# Patient Record
Sex: FEMALE | Race: WHITE | NOT HISPANIC OR LATINO | Employment: STUDENT | URBAN - METROPOLITAN AREA
[De-identification: names, ages, dates, MRNs, and addresses within clinical notes are randomized per-mention and may not be internally consistent; named-entity substitution may affect disease eponyms.]

---

## 2017-02-22 ENCOUNTER — HOSPITAL ENCOUNTER (EMERGENCY)
Facility: CLINIC | Age: 22
Discharge: HOME | End: 2017-02-22
Attending: EMERGENCY MEDICINE | Admitting: EMERGENCY MEDICINE
Payer: COMMERCIAL

## 2017-02-22 ENCOUNTER — APPOINTMENT (OUTPATIENT)
Dept: GENERAL RADIOLOGY | Facility: CLINIC | Age: 22
End: 2017-02-22
Attending: EMERGENCY MEDICINE
Payer: COMMERCIAL

## 2017-02-22 VITALS
SYSTOLIC BLOOD PRESSURE: 103 MMHG | RESPIRATION RATE: 11 BRPM | HEART RATE: 83 BPM | BODY MASS INDEX: 28.79 KG/M2 | DIASTOLIC BLOOD PRESSURE: 70 MMHG | WEIGHT: 173 LBS | OXYGEN SATURATION: 98 % | TEMPERATURE: 98.3 F

## 2017-02-22 DIAGNOSIS — R42 DIZZINESS: ICD-10-CM

## 2017-02-22 LAB
ALBUMIN UR-MCNC: NEGATIVE MG/DL
ANION GAP SERPL CALCULATED.3IONS-SCNC: 7 MMOL/L (ref 3–14)
APPEARANCE UR: ABNORMAL
BACTERIA #/AREA URNS HPF: ABNORMAL /HPF
BASOPHILS # BLD AUTO: 0 10E9/L (ref 0–0.2)
BASOPHILS NFR BLD AUTO: 0.3 %
BILIRUB UR QL STRIP: NEGATIVE
BUN SERPL-MCNC: 21 MG/DL (ref 7–30)
CALCIUM SERPL-MCNC: 8.7 MG/DL (ref 8.5–10.1)
CHLORIDE SERPL-SCNC: 110 MMOL/L (ref 94–109)
CO2 SERPL-SCNC: 26 MMOL/L (ref 20–32)
COLOR UR AUTO: YELLOW
CREAT SERPL-MCNC: 0.96 MG/DL (ref 0.52–1.04)
D DIMER PPP FEU-MCNC: 0.4 UG/ML FEU (ref 0–0.5)
DIFFERENTIAL METHOD BLD: NORMAL
EOSINOPHIL # BLD AUTO: 0.3 10E9/L (ref 0–0.7)
EOSINOPHIL NFR BLD AUTO: 3.7 %
ERYTHROCYTE [DISTWIDTH] IN BLOOD BY AUTOMATED COUNT: 12.6 % (ref 10–15)
GFR SERPL CREATININE-BSD FRML MDRD: 74 ML/MIN/1.7M2
GLUCOSE SERPL-MCNC: 94 MG/DL (ref 70–99)
GLUCOSE UR STRIP-MCNC: NEGATIVE MG/DL
HCG SERPL QL: NEGATIVE
HCT VFR BLD AUTO: 39.8 % (ref 35–47)
HGB BLD-MCNC: 13.4 G/DL (ref 11.7–15.7)
HGB UR QL STRIP: ABNORMAL
IMM GRANULOCYTES # BLD: 0 10E9/L (ref 0–0.4)
IMM GRANULOCYTES NFR BLD: 0.1 %
KETONES UR STRIP-MCNC: NEGATIVE MG/DL
LEUKOCYTE ESTERASE UR QL STRIP: ABNORMAL
LYMPHOCYTES # BLD AUTO: 3.4 10E9/L (ref 0.8–5.3)
LYMPHOCYTES NFR BLD AUTO: 39.6 %
MCH RBC QN AUTO: 30.2 PG (ref 26.5–33)
MCHC RBC AUTO-ENTMCNC: 33.7 G/DL (ref 31.5–36.5)
MCV RBC AUTO: 90 FL (ref 78–100)
MONOCYTES # BLD AUTO: 0.6 10E9/L (ref 0–1.3)
MONOCYTES NFR BLD AUTO: 6.5 %
MUCOUS THREADS #/AREA URNS LPF: PRESENT /LPF
NEUTROPHILS # BLD AUTO: 4.3 10E9/L (ref 1.6–8.3)
NEUTROPHILS NFR BLD AUTO: 49.8 %
NITRATE UR QL: NEGATIVE
NRBC # BLD AUTO: 0 10*3/UL
NRBC BLD AUTO-RTO: 0 /100
PH UR STRIP: 5.5 PH (ref 5–7)
PLATELET # BLD AUTO: 239 10E9/L (ref 150–450)
POTASSIUM SERPL-SCNC: 4 MMOL/L (ref 3.4–5.3)
RBC # BLD AUTO: 4.44 10E12/L (ref 3.8–5.2)
RBC #/AREA URNS AUTO: 1 /HPF (ref 0–2)
SODIUM SERPL-SCNC: 143 MMOL/L (ref 133–144)
SP GR UR STRIP: 1.02 (ref 1–1.03)
SQUAMOUS #/AREA URNS AUTO: 14 /HPF (ref 0–1)
TROPONIN I SERPL-MCNC: NORMAL UG/L (ref 0–0.04)
URN SPEC COLLECT METH UR: ABNORMAL
UROBILINOGEN UR STRIP-MCNC: NORMAL MG/DL (ref 0–2)
WBC # BLD AUTO: 8.6 10E9/L (ref 4–11)
WBC #/AREA URNS AUTO: 3 /HPF (ref 0–2)

## 2017-02-22 PROCEDURE — 99284 EMERGENCY DEPT VISIT MOD MDM: CPT | Mod: 25

## 2017-02-22 PROCEDURE — 96360 HYDRATION IV INFUSION INIT: CPT

## 2017-02-22 PROCEDURE — 80048 BASIC METABOLIC PNL TOTAL CA: CPT | Performed by: EMERGENCY MEDICINE

## 2017-02-22 PROCEDURE — 81001 URINALYSIS AUTO W/SCOPE: CPT | Performed by: EMERGENCY MEDICINE

## 2017-02-22 PROCEDURE — 84703 CHORIONIC GONADOTROPIN ASSAY: CPT | Performed by: EMERGENCY MEDICINE

## 2017-02-22 PROCEDURE — 71020 XR CHEST 2 VW: CPT

## 2017-02-22 PROCEDURE — 25000128 H RX IP 250 OP 636: Performed by: EMERGENCY MEDICINE

## 2017-02-22 PROCEDURE — 85025 COMPLETE CBC W/AUTO DIFF WBC: CPT | Performed by: EMERGENCY MEDICINE

## 2017-02-22 PROCEDURE — 84484 ASSAY OF TROPONIN QUANT: CPT | Performed by: EMERGENCY MEDICINE

## 2017-02-22 PROCEDURE — 96361 HYDRATE IV INFUSION ADD-ON: CPT

## 2017-02-22 PROCEDURE — 85379 FIBRIN DEGRADATION QUANT: CPT | Performed by: EMERGENCY MEDICINE

## 2017-02-22 RX ORDER — SODIUM CHLORIDE 9 MG/ML
1000 INJECTION, SOLUTION INTRAVENOUS CONTINUOUS
Status: DISCONTINUED | OUTPATIENT
Start: 2017-02-22 | End: 2017-02-22 | Stop reason: HOSPADM

## 2017-02-22 RX ADMIN — SODIUM CHLORIDE 1000 ML: 9 INJECTION, SOLUTION INTRAVENOUS at 02:01

## 2017-02-22 RX ADMIN — SODIUM CHLORIDE 1000 ML: 9 INJECTION, SOLUTION INTRAVENOUS at 02:49

## 2017-02-22 ASSESSMENT — ENCOUNTER SYMPTOMS
SHORTNESS OF BREATH: 0
NAUSEA: 0
FEVER: 0
NERVOUS/ANXIOUS: 1
LIGHT-HEADEDNESS: 1

## 2017-02-22 NOTE — ED AVS SNAPSHOT
Emergency Department    [address] [facility] [address]    Phone:  [phone]    Fax:  [phone]                                       Mar Velásquez   MRN: [id number]    Department:   Emergency Department   Date of Visit:  2/22/2017           Patient Information     Date Of Birth          1995        Your diagnoses for this visit were:     Dizziness        You were seen by Tra Campo MD.      Follow-up Information     Follow up with Aida Kemp MD In 1 week.    Specialty:  Pediatrics    Contact information:    [facility]  [address] [facility] [address]  [phone]          Follow up with  Emergency Department.    Specialty:  EMERGENCY MEDICINE    Why:  As needed, If symptoms worsen    Contact information:    [address] [facility] [id number] [phone]        Discharge Instructions         Possible Causes of Dizziness or Fainting  Dizziness and fainting can have many causes. Below are some examples of possible causes your health care provider will look to rule out.    Benign Paroxysmal Positional Vertigo (BPPV)  BPPV results when calcium crystals inside the inner ear shift into the wrong position. BPPV causes episodes of vertigo (a spinning sensation). Episodes most often occur when the head is moved in a certain way.  Infection or Inflammation  The semicircular canals of the ear may become infected or inflamed. In this case, they can send the wrong balance signals. This can cause vertigo.  Meniere s Disease  Meniere s disease happens when there is too much fluid in the semicircular canals. This can cause vertigo. It also can cause hearing problems and buzzing or ringing in the ears (called tinnitus). You may also have a feeling of pressure or fullness in the ear.  Syncope  Syncope is fainting that occurs when the brain doesn t get enough oxygen-rich blood. It can be caused by low heart rate or low blood pressure. This is  called vasovagal syncope. It can also be caused by sitting or standing up too quickly. This is called orthostatic hypotension. Syncope may also be due to a heart valve problem, an abnormal heart rhythm, or other heart problems. Dizziness can also occur from stroke, hemorrhage in the brain, or other problems in the brain. Your doctor may do certain tests to rule out these conditions.  Other Causes  Other causes include:    Medications. Certain medications can cause dizziness and even fainting. In some cases, stopping a medication too quickly can lead to withdrawal symptoms, including dizziness and fainting.    Anxiety. Being anxious can lead to breathing changes, such as hyperventilation. These can lead to dizziness and fainting.  Additional causes for dizziness and fainting also exist. Talk to your doctor for more information.          1659-6481 [employer]. [address] [place], [place] [id number]. All rights reserved. This information is not intended as a substitute for professional medical care. Always follow your healthcare professional's instructions.          Understanding Dizziness, Balance Problems, and Fainting  Balance is a group effort of the eyes, inner ear, joints, and muscles. They each send signals to the brain about body position and head movement. Then the brain uses this information to achieve balance. When the brain receives conflicting signals, or when there is a problem with blood flow, dizziness or fainting can happen.    Vertigo  Vertigo is the feeling of spinning. It may happen if the brain receives conflicting balance signals. Vertigo is often caused by a problem in the inner ear. Problems include changes in inner ear structures, infection, swelling, or excess fluid. Sometimes vertigo is due to a brain problem, such as migraine.   Dysequilibrium  Dysequilibrium is the feeling of imbalance without a sense of spinning. It may happen if the signal path between the body and brain  is disrupted. There are many causes of dysequilibrium, including diabetes, anemia, head injury, and aging.  Syncope  Syncope is losing consciousness or fainting. The brain needs oxygen-rich blood to function. The heart pumps that blood to the brain. If there is a problem with the heart, blood flow (such as low blood pressure), or blood vessels, faintness may happen.    0086-7859 [employer]. [address] [place] [address], [place] [address]. All rights reserved. This information is not intended as a substitute for professional medical care. Always follow your healthcare professional's instructions.          24 Hour Appointment Hotline       To make an appointment at any [facility], call [phone] ([phone]). If you don't have a family doctor or clinic, we will help you find one. [facility] clinics are conveniently located to serve the needs of you and your family.             Review of your medicines      Our records show that you are taking the medicines listed below. If these are incorrect, please call your family doctor or clinic.        Dose / Directions Last dose taken    ADDERALL PO        Refills:  0        albuterol 108 (90 BASE) MCG/ACT Inhaler   Commonly known as:  albuterol   Dose:  2 puff   Quantity:  1 Inhaler        Inhale 2 puffs into the lungs every 4 hours as needed for shortness of breath / dyspnea   Refills:  0          STOP taking        Dose Reason for stopping Comments    azithromycin 250 MG tablet   Commonly known as:  ZITHROMAX              benzonatate 200 MG capsule   Commonly known as:  TESSALON              HYDROcodone-acetaminophen 5-325 MG per tablet   Commonly known as:  NORCO                      Procedures and tests performed during your visit     Basic metabolic panel    CBC with platelets differential    Cardiac Continuous Monitoring    D dimer quantitative    HCG qualitative    Orthostatic blood pressure and pulse    Peripheral IV catheter    Pulse oximetry  nursing    Troponin I    UA with Microscopic    XR Chest 2 Views      Orders Needing Specimen Collection     None      Pending Results     No orders found from 2/20/2017 to 2/23/2017.            Pending Culture Results     No orders found from 2/20/2017 to 2/23/2017.             Test Results from your hospital stay     2/22/2017  2:02 AM - Interface, Flexilab Results      Component Results     Component Value Ref Range & Units Status    WBC 8.6 4.0 - 11.0 10e9/L Final    RBC Count 4.44 3.8 - 5.2 10e12/L Final    Hemoglobin 13.4 11.7 - 15.7 g/dL Final    Hematocrit 39.8 35.0 - 47.0 % Final    MCV 90 78 - 100 fl Final    MCH 30.2 26.5 - 33.0 pg Final    MCHC 33.7 31.5 - 36.5 g/dL Final    RDW 12.6 10.0 - 15.0 % Final    Platelet Count 239 150 - 450 10e9/L Final    Diff Method Automated Method  Final    % Neutrophils 49.8 % Final    % Lymphocytes 39.6 % Final    % Monocytes 6.5 % Final    % Eosinophils 3.7 % Final    % Basophils 0.3 % Final    % Immature Granulocytes 0.1 % Final    Nucleated RBCs 0 0 /100 Final    Absolute Neutrophil 4.3 1.6 - 8.3 10e9/L Final    Absolute Lymphocytes 3.4 0.8 - 5.3 10e9/L Final    Absolute Monocytes 0.6 0.0 - 1.3 10e9/L Final    Absolute Eosinophils 0.3 0.0 - 0.7 10e9/L Final    Absolute Basophils 0.0 0.0 - 0.2 10e9/L Final    Abs Immature Granulocytes 0.0 0 - 0.4 10e9/L Final    Absolute Nucleated RBC 0.0  Final         2/22/2017  2:14 AM - Interface, Flexilab Results      Component Results     Component Value Ref Range & Units Status    Sodium 143 133 - 144 mmol/L Final    Potassium 4.0 3.4 - 5.3 mmol/L Final    Chloride 110 (H) 94 - 109 mmol/L Final    Carbon Dioxide 26 20 - 32 mmol/L Final    Anion Gap 7 3 - 14 mmol/L Final    Glucose 94 70 - 99 mg/dL Final    Urea Nitrogen 21 7 - 30 mg/dL Final    Creatinine 0.96 0.52 - 1.04 mg/dL Final    GFR Estimate 74 >60 mL/min/1.7m2 Final    Non  GFR Calc    GFR Estimate If Black 89 >60 mL/min/1.7m2 Final    African American  GFR Calc    Calcium 8.7 8.5 - 10.1 mg/dL Final         2/22/2017  2:19 AM - Interface, Flexilab Results      Component Results     Component Value Ref Range & Units Status    Troponin I ES  0.000 - 0.045 ug/L Final    <0.015  The 99th percentile for upper reference range is 0.045 ug/L.  Troponin values in   the range of 0.045 - 0.120 ug/L may be associated with risks of adverse   clinical events.           2/22/2017  2:35 AM - Interface, Flexilab Results      Component Results     Component Value Ref Range & Units Status    HCG Qualitative Serum Negative NEG Final         2/22/2017  2:26 AM - Interface, Flexilab Results      Component Results     Component Value Ref Range & Units Status    D Dimer 0.4 0.0 - 0.50 ug/ml FEU Final    This D-dimer assay is intended for use in conjuntion with a clinical pretest   probability assessment model to exclude pulmonary embolism (PE) and as an aid   in the diagnosis of deep venous thrombosis (DVT) in outpatients suspected of   PE   or DVT. The cut-off value is 0.5 g/mL FEU.           2/22/2017  2:03 AM - Interface, Flexilab Results      Component Results     Component Value Ref Range & Units Status    Color Urine Yellow  Final    Appearance Urine Slightly Cloudy  Final    Glucose Urine Negative NEG mg/dL Final    Bilirubin Urine Negative NEG Final    Ketones Urine Negative NEG mg/dL Final    Specific Gravity Urine 1.021 1.003 - 1.035 Final    Blood Urine Moderate (A) NEG Final    pH Urine 5.5 5.0 - 7.0 pH Final    Protein Albumin Urine Negative NEG mg/dL Final    Urobilinogen mg/dL Normal 0.0 - 2.0 mg/dL Final    Nitrite Urine Negative NEG Final    Leukocyte Esterase Urine Trace (A) NEG Final    Source Midstream Urine  Final    WBC Urine 3 (H) 0 - 2 /HPF Final    RBC Urine 1 0 - 2 /HPF Final    Bacteria Urine Few (A) NEG /HPF Final    Squamous Epithelial /HPF Urine 14 (H) 0 - 1 /HPF Final    Mucous Urine Present (A) NEG /LPF Final         2/22/2017  3:22 AM - Interface, Radiant  Ib      Narrative     CHEST 2 VIEWS  2/22/2017 3:01 AM     HISTORY: Cough. Chest tightness.    COMPARISON: 6/14/2016.    FINDINGS: The lungs are clear. Normal-sized cardiac silhouette.        Impression     IMPRESSION: No evidence of active cardiopulmonary disease.    ZACHARY JEAN MD                Clinical Quality Measure: Blood Pressure Screening     Your blood pressure was checked while you were in the emergency department today. The last reading we obtained was  BP: 101/74 . Please read the guidelines below about what these numbers mean and what you should do about them.  If your systolic blood pressure (the top number) is less than 120 and your diastolic blood pressure (the bottom number) is less than 80, then your blood pressure is normal. There is nothing more that you need to do about it.  If your systolic blood pressure (the top number) is 120-139 or your diastolic blood pressure (the bottom number) is 80-89, your blood pressure may be higher than it should be. You should have your blood pressure rechecked within a year by a primary care provider.  If your systolic blood pressure (the top number) is 140 or greater or your diastolic blood pressure (the bottom number) is 90 or greater, you may have high blood pressure. High blood pressure is treatable, but if left untreated over time it can put you at risk for heart attack, stroke, or kidney failure. You should have your blood pressure rechecked by a primary care provider within the next 4 weeks.  If your provider in the emergency department today gave you specific instructions to follow-up with your doctor or provider even sooner than that, you should follow that instruction and not wait for up to 4 weeks for your follow-up visit.        Thank you for choosing [facility]       Thank you for choosing [facility] for your care. Our goal is always to provide you with excellent care. Hearing back from our patients is one way we can continue to improve our  "services. Please take a few minutes to complete the written survey that you may receive in the mail after you visit with us. Thank you!        [email or web address] Information     [facility] lets you send messages to your doctor, view your test results, renew your prescriptions, schedule appointments and more. To sign up, go to [email or web address] . Click on \"Log in\" on the left side of the screen, which will take you to the Welcome page. Then click on \"Sign up Now\" on the right side of the page.     You will be asked to enter the access code listed below, as well as some personal information. Please follow the directions to create your username and password.     Your access code is: [email or web address]  Expires: 2017  3:51 AM     Your access code will  in 90 days. If you need help or a new code, please call your [facility] clinic or [phone].        Care EveryWhere ID     This is your Care EveryWhere ID. This could be used by other organizations to access your [facility] medical records  [id number]        After Visit Summary       This is your record. Keep this with you and show to your community pharmacist(s) and doctor(s) at your next visit.                  "

## 2017-02-22 NOTE — DISCHARGE INSTRUCTIONS
Possible Causes of Dizziness or Fainting  Dizziness and fainting can have many causes. Below are some examples of possible causes your health care provider will look to rule out.    Benign Paroxysmal Positional Vertigo (BPPV)  BPPV results when calcium crystals inside the inner ear shift into the wrong position. BPPV causes episodes of vertigo (a spinning sensation). Episodes most often occur when the head is moved in a certain way.  Infection or Inflammation  The semicircular canals of the ear may become infected or inflamed. In this case, they can send the wrong balance signals. This can cause vertigo.  Meniere s Disease  Meniere s disease happens when there is too much fluid in the semicircular canals. This can cause vertigo. It also can cause hearing problems and buzzing or ringing in the ears (called tinnitus). You may also have a feeling of pressure or fullness in the ear.  Syncope  Syncope is fainting that occurs when the brain doesn t get enough oxygen-rich blood. It can be caused by low heart rate or low blood pressure. This is called vasovagal syncope. It can also be caused by sitting or standing up too quickly. This is called orthostatic hypotension. Syncope may also be due to a heart valve problem, an abnormal heart rhythm, or other heart problems. Dizziness can also occur from stroke, hemorrhage in the brain, or other problems in the brain. Your doctor may do certain tests to rule out these conditions.  Other Causes  Other causes include:    Medications. Certain medications can cause dizziness and even fainting. In some cases, stopping a medication too quickly can lead to withdrawal symptoms, including dizziness and fainting.    Anxiety. Being anxious can lead to breathing changes, such as hyperventilation. These can lead to dizziness and fainting.  Additional causes for dizziness and fainting also exist. Talk to your doctor for more information.          3394-9428 [employer] [address] [id number]. All rights reserved. This information is not intended as a substitute for professional medical care. Always follow your healthcare professional's instructions.          Understanding Dizziness, Balance Problems, and Fainting  Balance is a group effort of the eyes, inner ear, joints, and muscles. They each send signals to the brain about body position and head movement. Then the brain uses this information to achieve balance. When the brain receives conflicting signals, or when there is a problem with blood flow, dizziness or fainting can happen.    Vertigo  Vertigo is the feeling of spinning. It may happen if the brain receives conflicting balance signals. Vertigo is often caused by a problem in the inner ear. Problems include changes in inner ear structures, infection, swelling, or excess fluid. Sometimes vertigo is due to a brain problem, such as migraine.   Dysequilibrium  Dysequilibrium is the feeling of imbalance without a sense of spinning. It may happen if the signal path between the body and brain is disrupted. There are many causes of dysequilibrium, including diabetes, anemia, head injury, and aging.  Syncope  Syncope is losing consciousness or fainting. The brain needs oxygen-rich blood to function. The heart pumps that blood to the brain. If there is a problem with the heart, blood flow (such as low blood pressure), or blood vessels, faintness may happen.    6463-5415 [employer]. [address] [place] [address]. All rights reserved. This information is not intended as a substitute for professional medical care. Always follow your healthcare professional's instructions.

## 2017-02-22 NOTE — ED AVS SNAPSHOT
Emergency Department    [address] [place] [address]    Phone:  [phone]    Fax:  [phone]                                       Mar Velásquez   MRN: [id number]    Department:   Emergency Department   Date of Visit:  2/22/2017           After Visit Summary Signature Page     I have received my discharge instructions, and my questions have been answered. I have discussed any challenges I see with this plan with the nurse or doctor.    ..........................................................................................................................................  Patient/Patient Representative Signature      ..........................................................................................................................................  Patient Representative Print Name and Relationship to Patient    ..................................................               ................................................  Date                                            Time    ..........................................................................................................................................  Reviewed by Signature/Title    ...................................................              ..............................................  Date                                                            Time

## 2017-02-22 NOTE — ED PROVIDER NOTES
"  History     Chief Complaint:  Lightheadedness    HPI   Mar Velásquez is a 21 year old female who presents to the emergency department today for evaluation of lightheadedness. The patient reports that tonight she was at work when she was reaching for coins out of the register and started feeling lightheaded, as if she was going to faint, but affirms she did not lose consciousness. She had lunch prior to this and thought she would feel better with some water and relaxing, but did not. She went home and took a nap, but then woke up with a feeling of \"chest pressure\" and chills. She states that she continued to felt lightheaded as well and anxious; prompting her visit. Additionally, the patient notes that she is on Implanon birth control and does smoke cigarettes daily. She denies any long travel recently, history of DVT/PE or other concerns at this time. Moreover, she denies any shortness of breath, nausea, vision changes, leg swelling, or other concerns at this time. Of note, the patient also mentions that she stopped taking her Adderall three days ago because it was messing with her sleep cycle.     PE/DVT Risk Factors:  History of smoking - Positive  Birth Control: Positive  Personal history of PE/DVT - Negative  Family history of PE/DVT - Negative  Recent travel - Negative  Recent surgery - Negative  Other immobilizations - Negative  Cancer - Negative    Allergies:  No Known Drug Allergies     Medications:    Tessalon  Albuterol  Adderall     Past Medical History:    ADHD  Anxiety  Depression    Past Surgical History:    Tonsillectomy  ENT surgery    Family History:    Father - Hypertension, Diabetes  Paternal Grandfather - Alzheimer Disease    Social History:  The patient was accompanied to the ED by her mother.  Smoking Status: Current Every Day Smoker  Alcohol Use: Negative  Marital Status:  Single [1]    Review of Systems   Constitutional: Negative for fever.   Eyes: Negative for visual disturbance. " "  Respiratory: Negative for shortness of breath.    Cardiovascular: Positive for chest pain (\"Pressure\"). Negative for leg swelling.   Gastrointestinal: Negative for nausea.   Neurological: Positive for light-headedness. Negative for syncope.   Psychiatric/Behavioral: The patient is nervous/anxious.    All other systems reviewed and are negative.    Physical Exam   Vitals:  Patient Vitals for the past 24 hrs:   BP Temp Temp src Pulse Heart Rate Resp SpO2 Weight   02/22/17 0345 103/70 - - - 64 11 98 % -   02/22/17 0330 98/55 - - - 70 11 100 % -   02/22/17 0315 98/78 - - - 64 (!) 31 98 % -   02/22/17 0248 101/74 - - - 64 17 99 % -   02/22/17 0234 95/71 - - - 76 23 97 % -   02/22/17 0230 97/63 - - - 72 16 99 % -   02/22/17 0215 115/89 - - - 82 (!) 39 98 % -   02/22/17 0200 108/76 - - - 83 25 99 % -   02/22/17 0152 112/81 - - - 85 17 98 % -   02/22/17 0150 106/78 - - - 74 21 97 % -   02/22/17 0054 124/73 98.3  F (36.8  C) Oral 83 - 16 97 % 78.5 kg (173 lb)     Physical Exam  General: Alert, interactive in mild distress  Head:  Scalp is atraumatic  Eyes:  The pupils are equal, round, and reactive to light    EOM's intact    No scleral icterus  ENT:      Nose:  The external nose is normal  Ears:  External ears are normal  Mouth/Throat: The oropharynx is normal    Mucus membranes are moist       Neck:  Normal range of motion.      There is no rigidity.    Trachea is in the midline         CV:  Regular rate and rhythm    No murmur   Resp:  Breath sounds are clear bilaterally    Non-labored, no retractions or accessory muscle use      GI:  Abdomen is soft, no distension, no tenderness.       MS:  Normal strength in all 4 extremities, No midline cervical, thoracic, or lumbar tenderness  Skin:  Warm and dry, No rash or lesions noted.  Neuro: Strength 5/5 x4.  Sensation intact  In all 4 extremities.      Cranial nerves 2-12 grossly intact.     Psych:  Awake. Alert.  Mildly anxious affect.      Appropriate " interactions.    Emergency Department Course     Imaging:  Radiology findings were communicated with the patient who voiced understanding of the findings.    Chest x-ray, 2 views:  No evidence of active cardiopulmonary disease.  Reading per radiology    Laboratory:  Laboratory findings were communicated with the patient who voiced understanding of the findings.    UA: Leukocyte Esterase: Trace (A), Urine Blood: Moderate (A), Bacteria: Few (A), WBC/HPF: 3 (H), Mucous Present (A), Squamous Cells: 14 (H)  CBC: AWNL. (WBC 8.6, HGB 13.4, )   BMP: Chloride: 110 (H) (Creatinine 0.96)  Troponin (Collected 0153): <0.015  HCG Qualitative Urine: Negative  D Dimer (Collected 0153): 0.4    Interventions:  0201 ns 1000 mL IV  Emergency Department Course:  Nursing notes and vitals reviewed.  I performed an exam of the patient as documented above.   The patient was sent for a Chest x-ray while in the emergency department, results above.   IV was inserted and blood was drawn for laboratory testing, results above.  The patient provided a urine sample here in the emergency department. This was sent for laboratory testing, findings above.  At 0334 the patient was rechecked and was updated on the results of her laboratory and imaging studies.   I discussed the treatment plan with the patient and her mother. They expressed understanding of this plan and consented to discharge. They will be discharged home with instructions for care and follow up. In addition, the patient will return to the emergency department if their symptoms persist, worsen, if new symptoms arise or if there is any concern.  All questions were answered.  I personally reviewed the laboratory and imaging results with the patient and answered all related questions prior to discharge.    Impression & Plan      Medical Decision Making:  Mar Velásquez is a 21 year old female who presents to the emergency department today for evaluation of lightheadedness. The above work  up was undertaken returning as largely unremarkable. I do not have a clear etiology of the patient's symptom complex, however, there are no signs of acute hemodynamic instability and with a negative D-dimer I doubt pulmonary embolism. With mild chest discomfort, no cardiac risk factors, a negative troponin, and a non-ischemic EKG I doubt ACS and do not believe she needs any further work up for this. There is no focal neurologic deficits to suggest stroke. There is on signs of electrolyte abnormality or anemia. Patient was feeling well throughout her stay in the ED and can be safely discharged to home. I have recommended follow up with her primary care provider and return if symptoms develop.     Diagnosis:    ICD-10-CM    1. Dizziness R42        Plan: Discharge home as noted above.      Scribe Disclosure:  I, Enoc Nelson, am serving as a scribe at 2:15 AM on 2/22/2017 to document services personally performed by Tra Campo MD, based on my observations and the provider's statements to me.    2/22/2017    EMERGENCY DEPARTMENT       Tra Campo MD  02/22/17 0627

## 2018-06-18 ENCOUNTER — OFFICE VISIT (OUTPATIENT)
Dept: SLEEP MEDICINE | Facility: CLINIC | Age: 23
End: 2018-06-18
Payer: COMMERCIAL

## 2018-06-18 VITALS
OXYGEN SATURATION: 97 % | HEART RATE: 76 BPM | SYSTOLIC BLOOD PRESSURE: 101 MMHG | HEIGHT: 65 IN | WEIGHT: 209 LBS | DIASTOLIC BLOOD PRESSURE: 68 MMHG | RESPIRATION RATE: 18 BRPM | BODY MASS INDEX: 34.82 KG/M2

## 2018-06-18 DIAGNOSIS — G47.21 CIRCADIAN RHYTHM SLEEP DISORDER, DELAYED SLEEP PHASE TYPE: Primary | ICD-10-CM

## 2018-06-18 DIAGNOSIS — F33.8 SEASONAL AFFECTIVE DISORDER (H): ICD-10-CM

## 2018-06-18 DIAGNOSIS — F51.01 PRIMARY INSOMNIA: ICD-10-CM

## 2018-06-18 PROCEDURE — 99205 OFFICE O/P NEW HI 60 MIN: CPT | Performed by: NURSE PRACTITIONER

## 2018-06-18 RX ORDER — LAMOTRIGINE 100 MG/1
TABLET ORAL
COMMUNITY
Start: 2018-04-20

## 2018-06-18 RX ORDER — CLINDAMYCIN PHOSPHATE 10 MG/G
GEL TOPICAL
COMMUNITY
Start: 2018-03-06

## 2018-06-18 RX ORDER — FLUTICASONE PROPIONATE 50 MCG
2 SPRAY, SUSPENSION (ML) NASAL
COMMUNITY
Start: 2017-04-07

## 2018-06-18 NOTE — PROGRESS NOTES
"Allergies:    No Known Allergies    Medications:    Current Outpatient Prescriptions   Medication Sig Dispense Refill     albuterol (ALBUTEROL) 108 (90 BASE) MCG/ACT inhaler Inhale 2 puffs into the lungs every 4 hours as needed for shortness of breath / dyspnea 1 Inhaler 0     Amphetamine-Dextroamphetamine (ADDERALL PO)        clindamycin (CLINDAMAX) 1 % topical gel Apply twice daily to affected areas       fluticasone (FLONASE) 50 MCG/ACT spray 2 sprays       lamoTRIgine (LAMICTAL) 100 MG tablet After 25 mg tabs, start 100mg daily for 1 week then go to 200mg daily         Problem List:  Patient Active Problem List    Diagnosis Date Noted     Pregnancy test positive 01/30/2015     Priority: Medium     Depression, major, severe recurrence (H) 01/30/2015     Priority: Medium     Situational depression 01/29/2015     Priority: Medium        Past Medical/Surgical History:  Past Medical History:   Diagnosis Date     ADHD (attention deficit hyperactivity disorder)      Anxiety      Depressive disorder      Past Surgical History:   Procedure Laterality Date     ENT SURGERY       toncillectomy             Physical Examination:  Vitals: /68  Pulse 76  Resp 18  Ht 1.651 m (5' 5\")  Wt 94.8 kg (209 lb)  SpO2 97%  BMI 34.78 kg/m2  BMI= Body mass index is 34.78 kg/(m^2).    Neck Cir (cm): 39 cm    Stetson Total Score 6/18/2018   Total score - Stetson 11       GENERAL APPEARANCE: healthy, alert and no distress      "

## 2018-06-18 NOTE — MR AVS SNAPSHOT
After Visit Summary   6/18/2018    Mar Velásquez    MRN: [id number]           Patient Information     Date Of Birth          1995        Visit Information        Provider Department      6/18/2018 3:15 PM Joann Guthrie APRN [facility] [place]        Today's Diagnoses     Seasonal affective disorder (H)    -  1      Care Instructions    Insomnia - Delayed Sleep Phase  Each of us has a built in tendency to find it easier to stay up late at night or get up early in the morning.  Being a  night owl  or  early bird  is a function of our internal body clock.  When you are forced to keep a sleep schedule that goes against your typical habits (because a job or other responsibilities) insomnia can be the result.  Try the following changes to see if you can improve your sleep patterns:    Pick a sleep and wake schedule with about 7-8 hours in bed that is consistent.  That means keep the same bedtime (enter bed when sleepy)  and rise time every day of the week including the weekends, for the next 4-6 weeks    Try to get outside for about 30 minutes after you get up in the morning if the sun is out.  Sunlight is the best way to  set  your internal body clock  ( SAD lamp)                       Block bright light by avoiding screens in the 1.5 hrs before bedtime, or wear sunglasses                      Please keep a sleep log or diary during this time to track your sleep patterns                      Naps: 30 mins or less                      Return in 2 wks with diaries and how are you doing,  Don't try to move more than 2 hrs in the next 2 wks.                      School work from chair, relaxation from outside bed    Your BMI is Body mass index is 34.78 kg/(m^2).  Weight management is a personal decision.  If you are interested in exploring weight loss strategies, the following discussion covers the approaches that may be successful. Body mass index (BMI) is one way to tell whether you  are at a healthy weight, overweight, or obese. It measures your weight in relation to your height.  A BMI of 18.5 to 24.9 is in the healthy range. A person with a BMI of 25 to 29.9 is considered overweight, and someone with a BMI of 30 or greater is considered obese. More than two-thirds of American adults are considered overweight or obese.  Being overweight or obese increases the risk for further weight gain. Excess weight may lead to heart disease and diabetes.  Creating and following plans for healthy eating and physical activity may help you improve your health.  Weight control is part of healthy lifestyle and includes exercise, emotional health, and healthy eating habits. Careful eating habits lifelong are the mainstay of weight control. Though there are significant health benefits from weight loss, long-term weight loss with diet alone may be very difficult to achieve- studies show long-term success with dietary management in less than 10% of people. Attaining a healthy weight may be especially difficult to achieve in those with severe obesity. In some cases, medications, devices and surgical management might be considered.  What can you do?  If you are overweight or obese and are interested in methods for weight loss, you should discuss this with your provider.     Consider reducing daily calorie intake by 500 calories.     Keep a food journal.     Avoiding skipping meals, consider cutting portions instead.    Diet combined with exercise helps maintain muscle while optimizing fat loss. Strength training is particularly important for building and maintaining muscle mass. Exercise helps reduce stress, increase energy, and improves fitness. Increasing exercise without diet control, however, may not burn enough calories to loose weight.       Start walking three days a week 10-20 minutes at a time    Work towards walking thirty minutes five days a week     Eventually, increase the speed of your walking for 1-2  minutes at time    In addition, we recommend that you review healthy lifestyles and methods for weight loss available through the National Institutes of Health patient information sites:  http://win.niddk.nih.gov/publications/index.htm    And look into health and wellness programs that may be available through your health insurance provider, employer, local community center, or [employer]old[employer] club.    Weight management plan: Patient was referred to their PCP to discuss a diet and exercise plan.              Follow-ups after your visit        Follow-up notes from your care team     Return in about 2 weeks (around 7/2/2018) for return in 2 wks .      Your next 10 appointments already scheduled     Jul 03, 2018  1:30 PM CDT   Return Sleep Patient with LYNN Yao CNP   [facility] ([facility])    [address] [place] [id number] [phone]              Who to contact     If you have questions or need follow up information about today's clinic visit or your schedule please contact [facility] directly at [phone].  Normal or non-critical lab and imaging results will be communicated to you by MyChart, letter or phone within 4 business days after the clinic has received the results. If you do not hear from us within 7 days, please contact the clinic through MyChart or phone. If you have a critical or abnormal lab result, we will notify you by phone as soon as possible.  Submit refill requests through [employer] or call your pharmacy and they will forward the refill request to us. Please allow 3 business days for your refill to be completed.          Additional Information About Your Visit        Care EveryWhere ID     This is your Care EveryWhere ID. This could be used by other organizations to access your [facility] medical records  [id number]        Your Vitals Were     Pulse Respirations Height Pulse Oximetry BMI (Body  "Mass Index)       76 18 1.651 m (5' 5\") 97% 34.78 kg/m2        Blood Pressure from Last 3 Encounters:   06/18/18 101/68   02/22/17 103/70   06/14/16 107/62    Weight from Last 3 Encounters:   06/18/18 94.8 kg (209 lb)   02/22/17 78.5 kg (173 lb)   06/14/16 90.7 kg (200 lb) (97 %)*     * Growth percentiles are based on [facility] 2-20 Years data.              We Performed the Following     SAD Light  ()        Primary Care Provider Office Phone # Fax #    Aida Kemp [phone]       [place] [id number] [facility] [address]        Equal Access to Services     TERA OLIVEROS : Hadii aad ku hadasho Solibby, waaxda luqadaha, qaybta kaalmada adeegyada, susan thomas . [facility] [phone].    ATENCIÓN: Si habla español, tiene a wilson disposición servicios gratuitos de asistencia lingüística. Deyaame al [phone].    We comply with applicable federal civil rights laws and [place] laws. We do not discriminate on the basis of race, color, national origin, age, disability, sex, sexual orientation, or gender identity.            Thank you!     Thank you for choosing [facility]  for your care. Our goal is always to provide you with excellent care. Hearing back from our patients is one way we can continue to improve our services. Please take a few minutes to complete the written survey that you may receive in the mail after your visit with us. Thank you!             Your Updated Medication List - Protect others around you: Learn how to safely use, store and throw away your medicines at www.disposemymeds.org.          This list is accurate as of 6/18/18  4:53 PM.  Always use your most recent med list.                   Brand Name Dispense Instructions for use Diagnosis    ADDERALL PO           albuterol 108 (90 Base) MCG/ACT Inhaler    PROAIR HFA    1 Inhaler    Inhale 2 puffs into the lungs every 4 hours as needed for shortness of breath / " dyspnea        clindamycin 1 % topical gel    CLINDAMAX     Apply twice daily to affected areas        fluticasone 50 MCG/ACT spray    FLONASE     2 sprays        lamoTRIgine 100 MG tablet    LaMICtal     After 25 mg tabs, start 100mg daily for 1 week then go to 200mg daily

## 2018-06-18 NOTE — PATIENT INSTRUCTIONS
Insomnia - Delayed Sleep Phase  Each of us has a built in tendency to find it easier to stay up late at night or get up early in the morning.  Being a  night owl  or  early bird  is a function of our internal body clock.  When you are forced to keep a sleep schedule that goes against your typical habits (because a job or other responsibilities) insomnia can be the result.  Try the following changes to see if you can improve your sleep patterns:    Pick a sleep and wake schedule with about 7-8 hours in bed that is consistent.  That means keep the same bedtime (enter bed when sleepy)  and rise time every day of the week including the weekends, for the next 4-6 weeks    Try to get outside for about 30 minutes after you get up in the morning if the sun is out.  Sunlight is the best way to  set  your internal body clock  ( SAD lamp)                       Block bright light by avoiding screens in the 1.5 hrs before bedtime, or wear sunglasses                      Please keep a sleep log or diary during this time to track your sleep patterns                      Naps: 30 mins or less                      Return in 2 wks with diaries and how are you doing,  Don't try to move more than 2 hrs in the next 2 wks.                      School work from chair, relaxation from outside bed    Your BMI is Body mass index is 34.78 kg/(m^2).  Weight management is a personal decision.  If you are interested in exploring weight loss strategies, the following discussion covers the approaches that may be successful. Body mass index (BMI) is one way to tell whether you are at a healthy weight, overweight, or obese. It measures your weight in relation to your height.  A BMI of 18.5 to 24.9 is in the healthy range. A person with a BMI of 25 to 29.9 is considered overweight, and someone with a BMI of 30 or greater is considered obese. More than two-thirds of American adults are considered overweight or obese.  Being overweight or obese  increases the risk for further weight gain. Excess weight may lead to heart disease and diabetes.  Creating and following plans for healthy eating and physical activity may help you improve your health.  Weight control is part of healthy lifestyle and includes exercise, emotional health, and healthy eating habits. Careful eating habits lifelong are the mainstay of weight control. Though there are significant health benefits from weight loss, long-term weight loss with diet alone may be very difficult to achieve- studies show long-term success with dietary management in less than 10% of people. Attaining a healthy weight may be especially difficult to achieve in those with severe obesity. In some cases, medications, devices and surgical management might be considered.  What can you do?  If you are overweight or obese and are interested in methods for weight loss, you should discuss this with your provider.     Consider reducing daily calorie intake by 500 calories.     Keep a food journal.     Avoiding skipping meals, consider cutting portions instead.    Diet combined with exercise helps maintain muscle while optimizing fat loss. Strength training is particularly important for building and maintaining muscle mass. Exercise helps reduce stress, increase energy, and improves fitness. Increasing exercise without diet control, however, may not burn enough calories to loose weight.       Start walking three days a week 10-20 minutes at a time    Work towards walking thirty minutes five days a week     Eventually, increase the speed of your walking for 1-2 minutes at time    In addition, we recommend that you review healthy lifestyles and methods for weight loss available through the National Institutes of Health patient information sites:  http://win.niddk.nih.gov/publications/index.htm    And look into health and wellness programs that may be available through your health insurance provider, employer, local community  center, or [employer] club.    Weight management plan: Patient was referred to their PCP to discuss a diet and exercise plan.

## 2018-06-20 NOTE — PROGRESS NOTES
Visit Date:   06/18/2018      DATE OF VISIT: 06/18/2018      LOCATION:  [facility].      CHIEF COMPLAINT:  Ms. Velásquez self-refers for nonrestorative sleep, feeling that she cannot sleep or go into a super deep sleep.      HISTORY OF PRESENT ILLNESS:  Reports that she has been having a problem with sleep since she was a child, having problems with difficulty waking up, especially when needing to urinate at night.  Then as she went on to high school, had a hard time awakening for school and has found it quite easy to sleep in.  She also reports that 12 hours is not enough sleep at night and some episodes of wakeups with crying.      Sleep schedule is exceptionally erratic. Currently is on summer break.  May enter bed somewhere between 11 p.m. and 12 a.m., takes her melatonin around that time when she enters bed. Sleep usually occurs somewhere between 3 and 4 in the morning.  On weekends, will be able to fall asleep at around 6 in the morning.  Exits bed at about 3 p.m. on workdays and the weekend.  On rare occasions, may be able to get up out of bed at about 1 p.m.  She usually has dinner at about 8 p.m. when school was on board.  Most of her best time to do her homework was somewhere between 10 p.m. and 3 in the morning.  She wakes up 2 times a night, feels like it might take a couple hours to fall back asleep after wake up, usually with nocturia or not feeling well. When she wakes up, she presses the snooze. She is estimating she gets 4 hours of sleep on work nights, 12 on weekends.  Feels her best if she gets 12.  Does nap 5 times a week and they can be for 3 hours straight.  When she does wake up in the middle of the night, she will check the clock, she will worry about next day's function, she will worry about assignments that need to be done the next day, goes through her to-do list.  Activities in bed do include using a computer in bed.  She can turn the light onto an anti-blue.  She  does have a chair to do homework from.  Sleep might be disrupted by nightmares 3 times a week.  Sometimes those nightmares may affect her mood the next day or stay with her mind.  She does grind her teeth during the night and on occasion wakes up with a dry throat in the morning.  No signs of orexin deficiency with the exception of some sleep paralysis.      SOCIAL, PERSONAL, FAMILY HISTORY, AND SLEEPINESS SCALES: She is single, lives with mom, dad and her sister. She is currently a student at [employer] where the majority of her classes are online; however, this can be deceiving since often there are meetings that she has to go to earlier in the day.  Sleep environment is conducive to good sleep; however, there is some lack of privacy.      Family history is positive for sleep apnea, restless legs, sleepwalking.      Substances: Occasional alcohol with friends if she goes out.  No marijuana or cocaine.  Will take melatonin to help herself fall asleep.  Does use tobacco, about 8 cigarettes a day.  Did smoke about a pack a day for about a year and a half.        Her [place] Sleepiness Score today is 11/24.  Past 6 months, she has had some problems with sleepiness while driving where she feels spaced out while driving.  No motor vehicle accidents.  Does feel sleepiness affects her school with decreased energy; however, she does do well.  30/30 days, she is tired and fatigued.  30/30 days, mental health not good, and discussing this brings about tears since her cousin over in [place], I believe, needs a serious surgery due to a growth along his spinal column.      REVIEW OF SYSTEMS:  A 14-point comprehensive review of systems completed and negative with the exception of the following:   CONSTITUTIONAL:  Weight has gone up over the last year; however, she is on the losing end and doing quite well.   NERVOUS SYSTEM:  Headaches, can be any time.  Gets migraines, sometimes needs to remove herself from noise and light to nap.  "  PULMONARY:  Shortness of breath with activity; 2 flights, can do quite well with that.  Occasional cough up mucus.   DIGESTIVE:  Constipation at times.   MENTAL HEALTH:  Depression, anxiety and other mental health issues.      SURGICAL HISTORY:  Tonsillectomy.       MEDICAL HISTORY: Bipolar disorder, ADHD. Has been on Adderall since she was a sophomore in high school, takes it about 8 in the morning when she is in school, and on Lamictal.   Allergies:    No Known Allergies    Medications:    Current Outpatient Prescriptions   Medication Sig Dispense Refill     albuterol (ALBUTEROL) 108 (90 BASE) MCG/ACT inhaler Inhale 2 puffs into the lungs every 4 hours as needed for shortness of breath / dyspnea 1 Inhaler 0     Amphetamine-Dextroamphetamine (ADDERALL PO)        clindamycin (CLINDAMAX) 1 % topical gel Apply twice daily to affected areas       fluticasone (FLONASE) 50 MCG/ACT spray 2 sprays       lamoTRIgine (LAMICTAL) 100 MG tablet After 25 mg tabs, start 100mg daily for 1 week then go to 200mg daily         Problem List:  Patient Active Problem List    Diagnosis Date Noted     Pregnancy test positive 01/30/2015     Priority: Medium     Depression, major, severe recurrence (H) 01/30/2015     Priority: Medium     Situational depression 01/29/2015     Priority: Medium        Past Medical/Surgical History:  Past Medical History:   Diagnosis Date     ADHD (attention deficit hyperactivity disorder)      Anxiety      Depressive disorder      Past Surgical History:   Procedure Laterality Date     ENT SURGERY       toncillectomy             Physical Examination:  Vitals: /68  Pulse 76  Resp 18  Ht 1.651 m (5' 5\")  Wt 94.8 kg (209 lb)  SpO2 97%  BMI 34.78 kg/m2  BMI= Body mass index is 34.78 kg/(m^2).    Neck Cir (cm): 39 cm    [place] Total Score 6/18/2018   Total score - [place] 11       GENERAL APPEARANCE: healthy, alert and no distress     ASSESSMENT AND PLAN:  It is my impression that Ms. Mar Velásquez " struggles with delayed sleep phase disorder.  She has been a night owl for a prolonged period of time and has an interest in phase advancing. Concern, however, is she will be traveling east to support her cousin while he has surgery and this will make returning to her [facility] time zone quite challenging before school, and yet she seeks to make these changes. The following plan of care has been developed.   1.  Delayed sleep phase disorder.  I have laid out a plan for her to phase advance.  She is to avoid bright light at night and provide 30 minutes of bright light the first thing in the morning for the first half hour of the day.  She is to gradually move her rise time up by half hour to hour intervals over perhaps every 5 days till she has advanced by about 3 hrs, and then stay there until she does go to [place], and will worry about returning to a normal sleep schedule when she comes back.  I will see her in 2 weeks' time with diaries to see how things are going with this start.   2.  Seasonal affect disorder, depression and energy is worse in the winter months.  I have provided her with a 10,000 lux lamp.   3.  Possible features of insomnia with daytime worry, clock watching, worry about performance the next day.  I have offered to provide her with some education regarding these measures as well.  We did discuss the fact that once she has a different time, she will be able to be more productive at different times of day that would be more in line with the rest of the students in her class.      Thank you for allowing me to participate in this kind woman's care.      Time spent with patient -- 60 minutes, of which greater than 50% was spent in counseling, education and coordination of care.         LYNN CORBETT, CNP             D: 2018   T: 2018   MT:       Name:     ARTEMIO BULLARD   MRN:      [id number]        Account:      [id number]   :      1995           Visit Date:    06/18/2018      Document: [id number]

## 2018-07-09 ENCOUNTER — OFFICE VISIT (OUTPATIENT)
Dept: SLEEP MEDICINE | Facility: CLINIC | Age: 23
End: 2018-07-09
Payer: COMMERCIAL

## 2018-07-09 VITALS
HEIGHT: 65 IN | DIASTOLIC BLOOD PRESSURE: 76 MMHG | HEART RATE: 78 BPM | SYSTOLIC BLOOD PRESSURE: 105 MMHG | BODY MASS INDEX: 34.49 KG/M2 | WEIGHT: 207 LBS | RESPIRATION RATE: 16 BRPM | OXYGEN SATURATION: 98 %

## 2018-07-09 DIAGNOSIS — G47.21 CIRCADIAN RHYTHM SLEEP DISORDER, DELAYED SLEEP PHASE TYPE: Primary | ICD-10-CM

## 2018-07-09 DIAGNOSIS — F51.04 PSYCHOPHYSIOLOGICAL INSOMNIA: ICD-10-CM

## 2018-07-09 PROCEDURE — 99214 OFFICE O/P EST MOD 30 MIN: CPT | Performed by: NURSE PRACTITIONER

## 2018-07-09 RX ORDER — DEXTROAMPHETAMINE SACCHARATE, AMPHETAMINE ASPARTATE MONOHYDRATE, DEXTROAMPHETAMINE SULFATE AND AMPHETAMINE SULFATE 7.5; 7.5; 7.5; 7.5 MG/1; MG/1; MG/1; MG/1
CAPSULE, EXTENDED RELEASE ORAL
Refills: 0 | COMMUNITY
Start: 2018-02-27

## 2018-07-09 RX ORDER — BUPROPION HYDROCHLORIDE 150 MG/1
TABLET ORAL
Refills: 2 | COMMUNITY
Start: 2017-08-25

## 2018-07-09 NOTE — MR AVS SNAPSHOT
"              After Visit Summary   7/9/2018    Mar Velásquez    MRN: [id number]           Patient Information     Date Of Birth          1995        Visit Information        Provider Department      7/9/2018 11:00 AM Joann Guthrie APRN CNP [facility]        Care Instructions    Time spent in bed:  Reduce time in bed not devoted to sleep  Worry time: 3 hrs before bedtime.  Journal : list: to do list for next day, for week, thoughts/feelings/concerns  With wakeup: \"i've already done this work\"  Iphone: insight timer-ideas on mental distraction, visualization  Cover your clock    Stimulus control:  Bed for sleep and sex only (as you are doing)  Enter bed only when sleepy  Cover clocks, set alarm for wakeup for time  Document on sleep logs when you wakeup with your alarm      Set wakeup time: bring light  SAD 10,000 for 1/2 hr  Set bedtime: bedtime will eventually move up with exposure to light  Nap: on timer 30-40 mins when you get home, and on weekends    12:30 for wakeup with SAD lamp,  Keep there for 5 days, then move another 1/2 hr up      What are you going to do when sleepy when you don't want to be   At Home:  Walk around,  Get water,  Some people are helped by:  low isa snack: celery  Brief trip out the door  Call a friend    Off of work:   Poly. After lunch: brief timed nap  See above  Multitask-drawers, plants,        Sleep habits:  Alcohol not within 3-4 hrs of bedtime  My chart in 3 days-How is it going and do you need sleeper or not      No driving if exceptionally sleepy          Follow-ups after your visit        Follow-up notes from your care team     Return in about 21 days (around 7/30/2018) for get my chart let me know how you are doing,  follow up in 3 wks.      Who to contact     If you have questions or need follow up information about today's clinic visit or your schedule please contact [facility] directly at [phone].  Normal or non-critical lab " "and imaging results will be communicated to you by MyChart, letter or phone within 4 business days after the clinic has received the results. If you do not hear from us within 7 days, please contact the clinic through MyChart or phone. If you have a critical or abnormal lab result, we will notify you by phone as soon as possible.  Submit refill requests through [employer] or call your pharmacy and they will forward the refill request to us. Please allow 3 business days for your refill to be completed.          Additional Information About Your Visit        Care EveryWhere ID     This is your Care EveryWhere ID. This could be used by other organizations to access your [facility] medical records  [id number]        Your Vitals Were     Pulse Respirations Height Pulse Oximetry BMI (Body Mass Index)       78 16 1.651 m (5' 5\") 98% 34.45 kg/m2        Blood Pressure from Last 3 Encounters:   07/09/18 105/76   06/18/18 101/68   02/22/17 103/70    Weight from Last 3 Encounters:   07/09/18 93.9 kg (207 lb)   06/18/18 94.8 kg (209 lb)   02/22/17 78.5 kg (173 lb)              Today, you had the following     No orders found for display       Primary Care Provider Office Phone # Fax #    Aida Kemp [phone]       [address] [address]        Equal Access to Services     TERA OLIVEROS AH: Hadii shanda acosta hadasho Solibby, waaxda luqadaha, qaybta kaalmada evert, susan newman. So [facility] [phone].    ATENCIÓN: Si habla español, tiene a wilson disposición servicios gratuitos de asistencia lingüística. Osiel austin [phone].    We comply with applicable federal civil rights laws and [place] laws. We do not discriminate on the basis of race, color, national origin, age, disability, sex, sexual orientation, or gender identity.            Thank you!     Thank you for choosing [facility]  for your care. Our goal is always to provide " you with excellent care. Hearing back from our patients is one way we can continue to improve our services. Please take a few minutes to complete the written survey that you may receive in the mail after your visit with us. Thank you!             Your Updated Medication List - Protect others around you: Learn how to safely use, store and throw away your medicines at www.disposemymeds.org.          This list is accurate as of 7/9/18 11:52 AM.  Always use your most recent med list.                   Brand Name Dispense Instructions for use Diagnosis    albuterol 108 (90 Base) MCG/ACT Inhaler    PROAIR HFA    1 Inhaler    Inhale 2 puffs into the lungs every 4 hours as needed for shortness of breath / dyspnea        amphetamine-dextroamphetamine 30 MG per 24 hr capsule    ADDERALL XR          buPROPion 150 MG 24 hr tablet    WELLBUTRIN XL          clindamycin 1 % topical gel    CLINDAMAX     Apply twice daily to affected areas        fluticasone 50 MCG/ACT spray    FLONASE     2 sprays        lamoTRIgine 100 MG tablet    LaMICtal     After 25 mg tabs, start 100mg daily for 1 week then go to 200mg daily

## 2018-07-09 NOTE — PATIENT INSTRUCTIONS
"Time spent in bed:  Reduce time in bed not devoted to sleep  Worry time: 3 hrs before bedtime.  Journal : list: to do list for next day, for week, thoughts/feelings/concerns  With wakeup: \"i've already done this work\"  Iphone: insight timer-ideas on mental distraction, visualization  Cover your clock    Stimulus control:  Bed for sleep and sex only (as you are doing)  Enter bed only when sleepy  Cover clocks, set alarm for wakeup for time  Document on sleep logs when you wakeup with your alarm      Set wakeup time: bring light  SAD 10,000 for 1/2 hr  Set bedtime: bedtime will eventually move up with exposure to light  Nap: on timer 30-40 mins when you get home, and on weekends    12:30 for wakeup with SAD lamp,  Keep there for 5 days, then move another 1/2 hr up      What are you going to do when sleepy when you don't want to be   At Home:  Walk around,  Get water,  Some people are helped by:  low isa snack: celery  Brief trip out the door  Call a friend    Off of work:   Poly. After lunch: brief timed nap  See above  Multitask-drawers, plants,        Sleep habits:  Alcohol not within 3-4 hrs of bedtime  My chart in 3 days      No driving if exceptionally sleepy  "

## 2018-07-09 NOTE — PROGRESS NOTES
"    CC: pt returns today to discuss sleep schedule and develop a plan to gradually phase advance her \"erratic\" sleep schedule.     Reports that she has been having a problem with sleep since she was a child, having problems with difficulty waking up, especially when needing to urinate at night.  Then as she went on to high school, had a hard time awakening for school and has found it quite easy to sleep in.  She also reports that 12 hours is not enough sleep at night and some episodes of wakeups with crying.      Sleep schedule is exceptionally erratic or could be considered \"flexible\".  She understands her symptoms of fatigue and feeling poorly may be related to her malaligned circadian rhythm.    Verbal sleep schedule:  Bedtime:  Enter bed 12:00  2 or 3A- (recent change) home work is done late at night when she feels most alert.    Wind down: :12-2 or 3: wind down now from bed, but could do so from chair in room to read, use computer.     Sleep Latency: music left on, turn phone on silent,  1 or 2 hrs, HS 2 or 3.  Wakeups: maybe 1 every other day  Return to sleep:  Final wakeup time: 12- 1 pm,     Naps: when tired, not productive: 1- 4hrs in length 7-11P , 4/7    If wakeup at 7A, then may nap 1pm    Not staring at the clock  Worry at night-on occasion.    Allergies:    No Known Allergies    Medications:    Current Outpatient Prescriptions   Medication Sig Dispense Refill     albuterol (ALBUTEROL) 108 (90 BASE) MCG/ACT inhaler Inhale 2 puffs into the lungs every 4 hours as needed for shortness of breath / dyspnea 1 Inhaler 0     amphetamine-dextroamphetamine (ADDERALL XR) 30 MG per 24 hr capsule   0     buPROPion (WELLBUTRIN XL) 150 MG 24 hr tablet   2     clindamycin (CLINDAMAX) 1 % topical gel Apply twice daily to affected areas       fluticasone (FLONASE) 50 MCG/ACT spray 2 sprays       lamoTRIgine (LAMICTAL) 100 MG tablet After 25 mg tabs, start 100mg daily for 1 week then go to 200mg daily         Problem " "List:  Patient Active Problem List    Diagnosis Date Noted     Pregnancy test positive 01/30/2015     Priority: Medium     Depression, major, severe recurrence (H) 01/30/2015     Priority: Medium     Situational depression 01/29/2015     Priority: Medium        Past Medical/Surgical History:  Past Medical History:   Diagnosis Date     ADHD (attention deficit hyperactivity disorder)      Anxiety      Depressive disorder      Past Surgical History:   Procedure Laterality Date     ENT SURGERY       toncillectomy             Physical Examination:  Vitals: /76  Pulse 78  Resp 16  Ht 1.651 m (5' 5\")  Wt 93.9 kg (207 lb)  SpO2 98%  BMI 34.45 kg/m2  BMI= Body mass index is 34.45 kg/(m^2).         [place] Total Score 7/9/2018   Total score - [place] 9       GENERAL APPEARANCE: healthy, alert and mild distress    Assessment/Plan:  1.  Delayed sleep phase disorder with an irregular wake time.  I recommended that she use her sad lamp for one half hour (10,000 Lux) starting at 12:30 in the morning it will take some time for her bedtime to stabilize and normalize at approximately 2:30 AM she has a goal rise time of 9 AM and a bedtime of approximately midnight that she is working  towards.  She is to move her rise time up by half hour approximately every 5 days, keep sleep diaries, and return to see me in about 3 weeks time.  She is able to communicate with me by my chart.  She is encouraged to keep herself busy if it is not appropriate for her to nap, and if she does nap to keep it less than 40 minutes and on a timer.  2.  Insomnia, likely psychophysiologic she has a fair amount of worry with regard to daytime function with her inability to fall or stay asleep at the times she needs to get her work done.  I have given her some behavioral measures to help her keep alertness during the time when she is to be maintaining wakefulness, scutal utilize worry time since she tends to ruminate about her to do list and to " continue to keep the clock covered and use insight timer for mental distraction.      Time spent with patient 30 minutes of which greater than 50% was spent in counseling education and coordination of care end of dictation

## 2018-11-19 ENCOUNTER — HOSPITAL ENCOUNTER (EMERGENCY)
Facility: CLINIC | Age: 23
Discharge: HOME | End: 2018-11-19
Attending: EMERGENCY MEDICINE | Admitting: EMERGENCY MEDICINE
Payer: COMMERCIAL

## 2018-11-19 VITALS
SYSTOLIC BLOOD PRESSURE: 109 MMHG | TEMPERATURE: 98.1 F | BODY MASS INDEX: 32.99 KG/M2 | RESPIRATION RATE: 16 BRPM | WEIGHT: 198 LBS | HEART RATE: 85 BPM | OXYGEN SATURATION: 98 % | HEIGHT: 65 IN | DIASTOLIC BLOOD PRESSURE: 69 MMHG

## 2018-11-19 DIAGNOSIS — R41.3 AMNESIA: ICD-10-CM

## 2018-11-19 DIAGNOSIS — T74.21XA SEXUAL ASSAULT OF ADULT, INITIAL ENCOUNTER: ICD-10-CM

## 2018-11-19 LAB — HCG UR QL: NEGATIVE

## 2018-11-19 PROCEDURE — 25000132 ZZH RX MED GY IP 250 OP 250 PS 637: Performed by: EMERGENCY MEDICINE

## 2018-11-19 PROCEDURE — 25000128 H RX IP 250 OP 636: Performed by: EMERGENCY MEDICINE

## 2018-11-19 PROCEDURE — 25000125 ZZHC RX 250: Performed by: EMERGENCY MEDICINE

## 2018-11-19 PROCEDURE — 96372 THER/PROPH/DIAG INJ SC/IM: CPT

## 2018-11-19 PROCEDURE — 99285 EMERGENCY DEPT VISIT HI MDM: CPT | Mod: 25

## 2018-11-19 PROCEDURE — 81025 URINE PREGNANCY TEST: CPT | Performed by: EMERGENCY MEDICINE

## 2018-11-19 RX ORDER — AZITHROMYCIN 250 MG/1
1000 TABLET, FILM COATED ORAL ONCE
Status: COMPLETED | OUTPATIENT
Start: 2018-11-19 | End: 2018-11-19

## 2018-11-19 RX ORDER — METRONIDAZOLE 500 MG/1
2000 TABLET ORAL ONCE
Status: COMPLETED | OUTPATIENT
Start: 2018-11-19 | End: 2018-11-19

## 2018-11-19 RX ORDER — EMTRICITABINE AND TENOFOVIR DISOPROXIL FUMARATE 200; 300 MG/1; MG/1
1 TABLET, FILM COATED ORAL ONCE
Status: COMPLETED | OUTPATIENT
Start: 2018-11-19 | End: 2018-11-19

## 2018-11-19 RX ORDER — EMTRICITABINE AND TENOFOVIR DISOPROXIL FUMARATE 200; 300 MG/1; MG/1
1 TABLET, FILM COATED ORAL DAILY
Qty: 27 TABLET | Refills: 0 | Status: SHIPPED | OUTPATIENT
Start: 2018-11-19 | End: 2018-12-16

## 2018-11-19 RX ADMIN — LIDOCAINE HYDROCHLORIDE 250 MG: 10 INJECTION, SOLUTION EPIDURAL; INFILTRATION; INTRACAUDAL; PERINEURAL at 20:47

## 2018-11-19 RX ADMIN — EMTRICITABINE AND TENOFOVIR DISOPROXIL FUMARATE 1 TABLET: 200; 300 TABLET, FILM COATED ORAL at 20:42

## 2018-11-19 RX ADMIN — DOLUTEGRAVIR SODIUM 50 MG: 50 TABLET, FILM COATED ORAL at 20:42

## 2018-11-19 RX ADMIN — ULIPRISTAL ACETATE 30 MG: 30 TABLET ORAL at 20:42

## 2018-11-19 RX ADMIN — AZITHROMYCIN MONOHYDRATE 1000 MG: 250 TABLET ORAL at 20:42

## 2018-11-19 RX ADMIN — METRONIDAZOLE 2000 MG: 500 TABLET ORAL at 20:42

## 2018-11-19 ASSESSMENT — ENCOUNTER SYMPTOMS
HEADACHES: 0
COLOR CHANGE: 1
WOUND: 0
ABDOMINAL PAIN: 1
VOMITING: 1
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
## 2018-11-19 NOTE — ED AVS SNAPSHOT
Emergency Department    [address] [place] [address]    Phone:  [phone]    Fax:  [phone]                                       Mar Velásquez   MRN: [id number]    Department:   Emergency Department   Date of Visit:  11/19/2018           After Visit Summary Signature Page     I have received my discharge instructions, and my questions have been answered. I have discussed any challenges I see with this plan with the nurse or doctor.    ..........................................................................................................................................  Patient/Patient Representative Signature      ..........................................................................................................................................  Patient Representative Print Name and Relationship to Patient    ..................................................               ................................................  Date                                   Time    ..........................................................................................................................................  Reviewed by Signature/Title    ...................................................              ..............................................  Date                                               Time          22EPIC Rev 08/18
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
## 2020-08-12 ENCOUNTER — HOSPITAL ENCOUNTER (EMERGENCY)
Facility: CLINIC | Age: 25
Discharge: HOME | End: 2020-08-12
Attending: EMERGENCY MEDICINE | Admitting: EMERGENCY MEDICINE
Payer: COMMERCIAL

## 2020-08-12 ENCOUNTER — APPOINTMENT (OUTPATIENT)
Dept: ULTRASOUND IMAGING | Facility: CLINIC | Age: 25
End: 2020-08-12
Attending: EMERGENCY MEDICINE
Payer: COMMERCIAL

## 2020-08-12 VITALS
DIASTOLIC BLOOD PRESSURE: 80 MMHG | HEART RATE: 80 BPM | SYSTOLIC BLOOD PRESSURE: 122 MMHG | OXYGEN SATURATION: 98 % | RESPIRATION RATE: 16 BRPM | WEIGHT: 207.23 LBS | BODY MASS INDEX: 34.49 KG/M2 | TEMPERATURE: 98.2 F

## 2020-08-12 DIAGNOSIS — R11.10 RECURRENT VOMITING: ICD-10-CM

## 2020-08-12 LAB
ALBUMIN SERPL-MCNC: 3.8 G/DL (ref 3.4–5)
ALBUMIN UR-MCNC: 10 MG/DL
ALP SERPL-CCNC: 60 U/L (ref 40–150)
ALT SERPL W P-5'-P-CCNC: 27 U/L (ref 0–50)
ANION GAP SERPL CALCULATED.3IONS-SCNC: 4 MMOL/L (ref 3–14)
APPEARANCE UR: ABNORMAL
AST SERPL W P-5'-P-CCNC: 17 U/L (ref 0–45)
BASOPHILS # BLD AUTO: 0.1 10E9/L (ref 0–0.2)
BASOPHILS NFR BLD AUTO: 0.7 %
BILIRUB SERPL-MCNC: 0.2 MG/DL (ref 0.2–1.3)
BILIRUB UR QL STRIP: NEGATIVE
BUN SERPL-MCNC: 19 MG/DL (ref 7–30)
CALCIUM SERPL-MCNC: 9.3 MG/DL (ref 8.5–10.1)
CAOX CRY #/AREA URNS HPF: ABNORMAL /HPF
CHLORIDE SERPL-SCNC: 104 MMOL/L (ref 94–109)
CO2 SERPL-SCNC: 30 MMOL/L (ref 20–32)
COLOR UR AUTO: YELLOW
CREAT SERPL-MCNC: 0.97 MG/DL (ref 0.52–1.04)
DIFFERENTIAL METHOD BLD: NORMAL
EOSINOPHIL # BLD AUTO: 0.2 10E9/L (ref 0–0.7)
EOSINOPHIL NFR BLD AUTO: 2.1 %
ERYTHROCYTE [DISTWIDTH] IN BLOOD BY AUTOMATED COUNT: 12 % (ref 10–15)
GFR SERPL CREATININE-BSD FRML MDRD: 82 ML/MIN/{1.73_M2}
GLUCOSE SERPL-MCNC: 94 MG/DL (ref 70–99)
GLUCOSE UR STRIP-MCNC: NEGATIVE MG/DL
HCG UR QL: NEGATIVE
HCT VFR BLD AUTO: 38.9 % (ref 35–47)
HGB BLD-MCNC: 13.1 G/DL (ref 11.7–15.7)
HGB UR QL STRIP: ABNORMAL
IMM GRANULOCYTES # BLD: 0 10E9/L (ref 0–0.4)
IMM GRANULOCYTES NFR BLD: 0.1 %
KETONES UR STRIP-MCNC: 5 MG/DL
LEUKOCYTE ESTERASE UR QL STRIP: NEGATIVE
LIPASE SERPL-CCNC: 94 U/L (ref 73–393)
LYMPHOCYTES # BLD AUTO: 3.8 10E9/L (ref 0.8–5.3)
LYMPHOCYTES NFR BLD AUTO: 42.6 %
MCH RBC QN AUTO: 30.9 PG (ref 26.5–33)
MCHC RBC AUTO-ENTMCNC: 33.7 G/DL (ref 31.5–36.5)
MCV RBC AUTO: 92 FL (ref 78–100)
MONOCYTES # BLD AUTO: 0.7 10E9/L (ref 0–1.3)
MONOCYTES NFR BLD AUTO: 7.9 %
MUCOUS THREADS #/AREA URNS LPF: PRESENT /LPF
NEUTROPHILS # BLD AUTO: 4.2 10E9/L (ref 1.6–8.3)
NEUTROPHILS NFR BLD AUTO: 46.6 %
NITRATE UR QL: NEGATIVE
NRBC # BLD AUTO: 0 10*3/UL
NRBC BLD AUTO-RTO: 0 /100
PH UR STRIP: 5.5 PH (ref 5–7)
PLATELET # BLD AUTO: 266 10E9/L (ref 150–450)
POTASSIUM SERPL-SCNC: 4 MMOL/L (ref 3.4–5.3)
PROT SERPL-MCNC: 7.8 G/DL (ref 6.8–8.8)
RBC # BLD AUTO: 4.24 10E12/L (ref 3.8–5.2)
RBC #/AREA URNS AUTO: 7 /HPF (ref 0–2)
SODIUM SERPL-SCNC: 138 MMOL/L (ref 133–144)
SOURCE: ABNORMAL
SP GR UR STRIP: 1.03 (ref 1–1.03)
SQUAMOUS #/AREA URNS AUTO: 12 /HPF (ref 0–1)
UROBILINOGEN UR STRIP-MCNC: 2 MG/DL (ref 0–2)
WBC # BLD AUTO: 8.9 10E9/L (ref 4–11)
WBC #/AREA URNS AUTO: 1 /HPF (ref 0–5)

## 2020-08-12 PROCEDURE — 96375 TX/PRO/DX INJ NEW DRUG ADDON: CPT

## 2020-08-12 PROCEDURE — 80053 COMPREHEN METABOLIC PANEL: CPT | Performed by: EMERGENCY MEDICINE

## 2020-08-12 PROCEDURE — 83690 ASSAY OF LIPASE: CPT | Performed by: EMERGENCY MEDICINE

## 2020-08-12 PROCEDURE — 81025 URINE PREGNANCY TEST: CPT | Performed by: EMERGENCY MEDICINE

## 2020-08-12 PROCEDURE — 96374 THER/PROPH/DIAG INJ IV PUSH: CPT

## 2020-08-12 PROCEDURE — 85025 COMPLETE CBC W/AUTO DIFF WBC: CPT | Performed by: EMERGENCY MEDICINE

## 2020-08-12 PROCEDURE — 99285 EMERGENCY DEPT VISIT HI MDM: CPT | Mod: 25

## 2020-08-12 PROCEDURE — 76705 ECHO EXAM OF ABDOMEN: CPT

## 2020-08-12 PROCEDURE — 25000128 H RX IP 250 OP 636: Performed by: EMERGENCY MEDICINE

## 2020-08-12 PROCEDURE — 81001 URINALYSIS AUTO W/SCOPE: CPT | Performed by: EMERGENCY MEDICINE

## 2020-08-12 RX ORDER — DIPHENHYDRAMINE HYDROCHLORIDE 50 MG/ML
25 INJECTION INTRAMUSCULAR; INTRAVENOUS ONCE
Status: COMPLETED | OUTPATIENT
Start: 2020-08-12 | End: 2020-08-12

## 2020-08-12 RX ORDER — PROCHLORPERAZINE MALEATE 10 MG
10 TABLET ORAL EVERY 6 HOURS PRN
Qty: 15 TABLET | Refills: 0 | Status: SHIPPED | OUTPATIENT
Start: 2020-08-12

## 2020-08-12 RX ADMIN — PROCHLORPERAZINE EDISYLATE 5 MG: 5 INJECTION INTRAMUSCULAR; INTRAVENOUS at 05:05

## 2020-08-12 RX ADMIN — DIPHENHYDRAMINE HYDROCHLORIDE 25 MG: 50 INJECTION, SOLUTION INTRAMUSCULAR; INTRAVENOUS at 05:05

## 2020-08-12 ASSESSMENT — ENCOUNTER SYMPTOMS: ABDOMINAL PAIN: 1

## 2020-08-12 NOTE — ED PROVIDER NOTES
History     Chief Complaint:  Abdominal Pain    HPI   Mar Velásquez is a 25 year old female who presents with abdominal pain and nausea. The patient has been having nausea and emesis for the last three days; the episodes of emesis come on very abruptly as well as sharp shooting pains in her right upper quadrant. She reports that she has had fatigue and alternating episodes of dark diarrhea with constipation. She video called [facility] yesterday and was started on Zofran and advised to come to the ED if symptoms worsened; she was not able to alleviate her nausea and decided to present. She denies any sore throat, loss of taste/smell, dysuria, hematuria, hematochezia, cough, shortness of breath, chest pain, or fever.      Allergies:  No Known Drug Allergies     Medications:    Zofran  Cymbalta  Adderall  Trazodone  Atarax  Albuterol inhaler     Past Medical History:    Anxiety  Asthma  Depression  ADHD  Obesity     Past Surgical History:    Tonsillectomy     Family History:    Hypertension  Diabetes    Social History:  Positive for tobacco use.  Positive for alcohol use.   Negative for drug use.  Marital Status:  Single     Review of Systems   Gastrointestinal: Positive for abdominal pain.   All other systems reviewed and are negative.  See HPI for ROS, all other systems are negative.    Physical Exam   First Vitals:  Patient Vitals for the past 24 hrs:   BP Temp Temp src Pulse Resp SpO2 Weight   08/12/20 0101 122/80 98.2  F (36.8  C) Oral 80 16 98 % 94 kg (207 lb 3.7 oz)        Physical Exam  Nursing note and vitals reviewed.  Constitutional:  Appears well-developed and well-nourished.   HENT:   Head:    Atraumatic.   Mouth/Throat:   Oropharynx is clear and moist. No oropharyngeal exudate.   Eyes:    Pupils are equal, round, and reactive to light.   Neck:    Normal range of motion. Neck supple.      No tracheal deviation present. No thyromegaly present.   Cardiovascular:  Normal rate, regular rhythm, no murmur    Pulmonary/Chest: Breath sounds are clear and equal without wheezes or crackles.  Abdominal:   Soft. Bowel sounds are normal. Exhibits no distension and      no mass. Right upper quadrant tenderness. No CVA tenderness.      There is no rebound and no guarding.   Musculoskeletal:  Exhibits no edema.   Lymphadenopathy:  No cervical adenopathy.   Neurological:   Alert and oriented to person, place, and time.   Skin:    Skin is warm and dry. No rash noted. No pallor.      Emergency Department Course     Imaging:  Radiology findings were communicated with the patient who voiced understanding of the findings.    Abdomen US, limited (RUQ only)    1.  No cholelithiasis, pericholecystic fluid or gallbladder wall thickening.    2.  No biliary dilatation.    3.  Liver unremarkable.    4.  No hydronephrosis.      Laboratory:  Laboratory findings were communicated with the patient who voiced understanding of the findings.    UA with Microscopic: blood trace (A) Ketone 5 (A) protein albumin 10 (A) Mucous presnt (A) RBC/HPF 7 (H) Squamous Epithelial 12 (H) Mucous present (A) Calcium oxalate few (A)   O/w WNL   HCG Qualitative: Negative    Lipase: 94    CBC: WBC 8.9, HGB 13.1,   CMP: AWNL (Creatinine 0.97)     Interventions:  0505 Compazine 5 mg IV  0505 Benadryl 25 mg IV    Emergency Department Course:  0337 IV was inserted and blood was drawn for laboratory testing, results above.     0408 Nursing notes and vitals reviewed.    0420 I performed an exam of the patient as documented above.     0505 The patient was sent for a US while in the emergency department, results above.      0458 The patient provided a urine sample here in the emergency department. This was sent for laboratory testing, findings above.     0557 Findings and plan explained to the Patient. Patient discharged home with instructions regarding supportive care, medications, and reasons to return. The importance of close follow-up was reviewed.     Impression  & Plan     Medical Decision Making:  I found this patient to have recurrent vomiting which I think is likely due to viral gastroenteritis. There is no sign of appendicitis, hepatitis, pancreatitis, cholecystitis, pregnancy, UTI, or other serious problem and felt she could be safely discharged home. She does not appear significantly dehydrated and she was tolerating PO here.  She was prescribed compazine since she is not able to tolerate the Zofran and referred to Dr. Jarquin of GI. Discussed signs and symptoms to return for. She was told to take a benadryl with the compazine and to avoid driving with the compazine as it can make her tired.      Diagnosis:    ICD-10-CM    1. Recurrent vomiting  R11.10        Disposition:  The patient is discharged to home.     Discharge Medications:  New Prescriptions    PROCHLORPERAZINE (COMPAZINE) 10 MG TABLET    Take 1 tablet (10 mg) by mouth every 6 hours as needed for nausea or vomiting No driving a car or drinking alcohol for 6 hours after taking this medication.       Scribe Disclosure:  I, Jarrod Mcgee, am serving as a scribe at 4:20 AM on 8/12/2020 to document services personally performed by Marisa Murrell MD based on my observations and the provider's statements to me.         EMERGENCY DEPARTMENT       Marisa Murrell MD  08/12/20 6182

## 2020-08-12 NOTE — ED NOTES
"Patient having specific questions & worried \"specifically about my liver\".  Explained ED process & tests ordered based on symptoms she is presenting with.    Patient in no obvious distress.  Talkative & pleasant.    "
[facility]

## 2020-08-12 NOTE — ED AVS SNAPSHOT
Emergency Department  [address] [facility] [address]  Phone:  [phone]  Fax:  [phone]                                    Mar Velásquez   MRN: [id number]    Department:   Emergency Department   Date of Visit:  8/12/2020           After Visit Summary Signature Page    I have received my discharge instructions, and my questions have been answered. I have discussed any challenges I see with this plan with the nurse or doctor.    ..........................................................................................................................................  Patient/Patient Representative Signature      ..........................................................................................................................................  Patient Representative Print Name and Relationship to Patient    ..................................................               ................................................  Date                                   Time    ..........................................................................................................................................  Reviewed by Signature/Title    ...................................................              ..............................................  Date                                               Time          22EPIC Rev 08/18

## 2020-08-24 ENCOUNTER — HOSPITAL ENCOUNTER (EMERGENCY)
Facility: CLINIC | Age: 25
Discharge: HOME | End: 2020-08-24
Attending: EMERGENCY MEDICINE | Admitting: EMERGENCY MEDICINE
Payer: COMMERCIAL

## 2020-08-24 VITALS
OXYGEN SATURATION: 97 % | SYSTOLIC BLOOD PRESSURE: 101 MMHG | RESPIRATION RATE: 20 BRPM | HEIGHT: 65 IN | DIASTOLIC BLOOD PRESSURE: 68 MMHG | HEART RATE: 110 BPM | BODY MASS INDEX: 34.49 KG/M2 | TEMPERATURE: 97.5 F | WEIGHT: 207 LBS

## 2020-08-24 DIAGNOSIS — K59.00 CONSTIPATION, UNSPECIFIED CONSTIPATION TYPE: ICD-10-CM

## 2020-08-24 DIAGNOSIS — R10.84 GENERALIZED ABDOMINAL PAIN: ICD-10-CM

## 2020-08-24 DIAGNOSIS — K62.5 RECTAL BLEEDING: ICD-10-CM

## 2020-08-24 LAB
ABO + RH BLD: NORMAL
ABO + RH BLD: NORMAL
ALBUMIN SERPL-MCNC: 3.9 G/DL (ref 3.4–5)
ALBUMIN UR-MCNC: NEGATIVE MG/DL
ALP SERPL-CCNC: 62 U/L (ref 40–150)
ALT SERPL W P-5'-P-CCNC: 20 U/L (ref 0–50)
ANION GAP SERPL CALCULATED.3IONS-SCNC: 2 MMOL/L (ref 3–14)
APPEARANCE UR: CLEAR
AST SERPL W P-5'-P-CCNC: 15 U/L (ref 0–45)
BASOPHILS # BLD AUTO: 0 10E9/L (ref 0–0.2)
BASOPHILS NFR BLD AUTO: 0.4 %
BILIRUB SERPL-MCNC: 0.2 MG/DL (ref 0.2–1.3)
BILIRUB UR QL STRIP: NEGATIVE
BLD GP AB SCN SERPL QL: NORMAL
BLOOD BANK CMNT PATIENT-IMP: NORMAL
BUN SERPL-MCNC: 12 MG/DL (ref 7–30)
CALCIUM SERPL-MCNC: 9.3 MG/DL (ref 8.5–10.1)
CHLORIDE SERPL-SCNC: 105 MMOL/L (ref 94–109)
CO2 SERPL-SCNC: 30 MMOL/L (ref 20–32)
COLOR UR AUTO: YELLOW
CREAT SERPL-MCNC: 0.82 MG/DL (ref 0.52–1.04)
DIFFERENTIAL METHOD BLD: NORMAL
EOSINOPHIL # BLD AUTO: 0.3 10E9/L (ref 0–0.7)
EOSINOPHIL NFR BLD AUTO: 3.2 %
ERYTHROCYTE [DISTWIDTH] IN BLOOD BY AUTOMATED COUNT: 12 % (ref 10–15)
GFR SERPL CREATININE-BSD FRML MDRD: >90 ML/MIN/{1.73_M2}
GLUCOSE SERPL-MCNC: 79 MG/DL (ref 70–99)
GLUCOSE UR STRIP-MCNC: NEGATIVE MG/DL
HCG UR QL: NEGATIVE
HCT VFR BLD AUTO: 40.7 % (ref 35–47)
HEMOCCULT STL QL: NEGATIVE
HGB BLD-MCNC: 13.9 G/DL (ref 11.7–15.7)
HGB UR QL STRIP: NEGATIVE
IMM GRANULOCYTES # BLD: 0 10E9/L (ref 0–0.4)
IMM GRANULOCYTES NFR BLD: 0.1 %
KETONES UR STRIP-MCNC: NEGATIVE MG/DL
LEUKOCYTE ESTERASE UR QL STRIP: NEGATIVE
LIPASE SERPL-CCNC: 123 U/L (ref 73–393)
LYMPHOCYTES # BLD AUTO: 3.1 10E9/L (ref 0.8–5.3)
LYMPHOCYTES NFR BLD AUTO: 36.6 %
MCH RBC QN AUTO: 31.2 PG (ref 26.5–33)
MCHC RBC AUTO-ENTMCNC: 34.2 G/DL (ref 31.5–36.5)
MCV RBC AUTO: 92 FL (ref 78–100)
MONOCYTES # BLD AUTO: 0.7 10E9/L (ref 0–1.3)
MONOCYTES NFR BLD AUTO: 8 %
NEUTROPHILS # BLD AUTO: 4.3 10E9/L (ref 1.6–8.3)
NEUTROPHILS NFR BLD AUTO: 51.7 %
NITRATE UR QL: NEGATIVE
NRBC # BLD AUTO: 0 10*3/UL
NRBC BLD AUTO-RTO: 0 /100
PH UR STRIP: 6.5 PH (ref 5–7)
PLATELET # BLD AUTO: 255 10E9/L (ref 150–450)
POTASSIUM SERPL-SCNC: 3.9 MMOL/L (ref 3.4–5.3)
PROT SERPL-MCNC: 7.6 G/DL (ref 6.8–8.8)
RBC # BLD AUTO: 4.45 10E12/L (ref 3.8–5.2)
RBC #/AREA URNS AUTO: 1 /HPF (ref 0–2)
SODIUM SERPL-SCNC: 137 MMOL/L (ref 133–144)
SOURCE: ABNORMAL
SP GR UR STRIP: 1.02 (ref 1–1.03)
SPECIMEN EXP DATE BLD: NORMAL
SQUAMOUS #/AREA URNS AUTO: 4 /HPF (ref 0–1)
UROBILINOGEN UR STRIP-MCNC: NORMAL MG/DL (ref 0–2)
WBC # BLD AUTO: 8.3 10E9/L (ref 4–11)
WBC #/AREA URNS AUTO: 0 /HPF (ref 0–5)

## 2020-08-24 PROCEDURE — 86901 BLOOD TYPING SEROLOGIC RH(D): CPT | Performed by: EMERGENCY MEDICINE

## 2020-08-24 PROCEDURE — 99283 EMERGENCY DEPT VISIT LOW MDM: CPT | Mod: 25

## 2020-08-24 PROCEDURE — 81001 URINALYSIS AUTO W/SCOPE: CPT | Performed by: EMERGENCY MEDICINE

## 2020-08-24 PROCEDURE — 25800030 ZZH RX IP 258 OP 636: Performed by: EMERGENCY MEDICINE

## 2020-08-24 PROCEDURE — 86900 BLOOD TYPING SEROLOGIC ABO: CPT | Performed by: EMERGENCY MEDICINE

## 2020-08-24 PROCEDURE — 96360 HYDRATION IV INFUSION INIT: CPT

## 2020-08-24 PROCEDURE — 85025 COMPLETE CBC W/AUTO DIFF WBC: CPT | Performed by: EMERGENCY MEDICINE

## 2020-08-24 PROCEDURE — 80053 COMPREHEN METABOLIC PANEL: CPT | Performed by: EMERGENCY MEDICINE

## 2020-08-24 PROCEDURE — 82272 OCCULT BLD FECES 1-3 TESTS: CPT | Performed by: EMERGENCY MEDICINE

## 2020-08-24 PROCEDURE — 81025 URINE PREGNANCY TEST: CPT | Performed by: EMERGENCY MEDICINE

## 2020-08-24 PROCEDURE — 96361 HYDRATE IV INFUSION ADD-ON: CPT

## 2020-08-24 PROCEDURE — 83605 ASSAY OF LACTIC ACID: CPT | Performed by: EMERGENCY MEDICINE

## 2020-08-24 PROCEDURE — 86850 RBC ANTIBODY SCREEN: CPT | Performed by: EMERGENCY MEDICINE

## 2020-08-24 PROCEDURE — 83690 ASSAY OF LIPASE: CPT | Performed by: EMERGENCY MEDICINE

## 2020-08-24 RX ORDER — SODIUM CHLORIDE 9 MG/ML
INJECTION, SOLUTION INTRAVENOUS CONTINUOUS
Status: DISCONTINUED | OUTPATIENT
Start: 2020-08-24 | End: 2020-08-24 | Stop reason: HOSPADM

## 2020-08-24 RX ADMIN — SODIUM CHLORIDE 1000 ML: 9 INJECTION, SOLUTION INTRAVENOUS at 18:51

## 2020-08-24 ASSESSMENT — ENCOUNTER SYMPTOMS
ABDOMINAL PAIN: 1
ANAL BLEEDING: 1
NAUSEA: 0
COUGH: 0
DYSURIA: 0
VOMITING: 0
RHINORRHEA: 0
SORE THROAT: 0

## 2020-08-24 ASSESSMENT — MIFFLIN-ST. JEOR: SCORE: 1689.83

## 2020-08-24 NOTE — ED TRIAGE NOTES
Intermittent abd pain X 2 wk, was seen here and started on omeprazole and reflux improved. States she had some rectal bleeding on Friday and Saturday, none since. Today c/o dizziness and being pale, called rn line and told pt had to come be evaled

## 2020-08-24 NOTE — ED PROVIDER NOTES
History     Chief Complaint:  Abdominal Pain and Rectal Bleeding    The history is provided by the patient.      Mar Velásquez is a 25 year old female who presents with abdominal pain and rectal bleeding. The patient reports being seen last week (8/12/20) due to several episodes of emesis. While here in the ED, she was given Compazine with Benadryl which alleviated the nausea and emesis. The patient presents now reporting rectal bleeding beginning last Friday (8/21/20) evening. The patient reports wiping after urinating and noticing a significant amount of bright red blood on the toilet paper. She reports having a bowel movement on Saturday (8/22/20) where she noticed having much darker stools than she is used to having. Today, the patient reports having abdominal pain that she describes as dull and episodic. She reports having intermittent pain in her left lower quadrant at night and occasional pain in her right upper quadrant. She reports being on Adderall, trazodone, Reglan, omeprazole, and duloxetine.  Currently in the ED, she has no pain. She denies dysuria, vaginal discharge, vaginal bleeding, chest pain, cough, sore throat, rhinorrhea.     Allergies:  No Known Drug Allergies     Medications:    Albuterol inhaler  Adderall  Cymbalta  Trazodone   Prilosec  Compazine  Desyrel  Atarax    Past Medical History:    Anxiety   Asthma  Acne  Depression  Obesity   ADHD  Spina bifida  Dyslipidemia  Allergic rhinitis  Self-injurious behavior    Past Surgical History:    Tonsillectomy    Family History:    CAD: Father  Type II diabetes  Anxiety  Bipolar disorder    Social History:  Positive for tobacco use.  Positive for alcohol use.   Negative for drug use.  Marital Status:  Single      Review of Systems   HENT: Negative for rhinorrhea and sore throat.    Respiratory: Negative for cough.    Cardiovascular: Negative for chest pain.   Gastrointestinal: Positive for abdominal pain and anal bleeding. Negative for nausea and  "vomiting.   Genitourinary: Negative for dysuria, vaginal bleeding and vaginal discharge.   All other systems reviewed and are negative.      Physical Exam     Patient Vitals for the past 24 hrs:   BP Temp Temp src Pulse Resp SpO2 Height Weight   08/24/20 1900 101/68 -- -- -- -- -- -- --   08/24/20 1534 128/60 97.5  F (36.4  C) Oral 110 20 97 % 1.651 m (5' 5\") 93.9 kg (207 lb)       Physical Exam  General: Resting on the bed.  Appears comfortable   Head: No obvious trauma to head.  Ears, Nose, Throat:  External ears normal.  Nose normal.  Eyes:  Conjunctivae clear.  Pupils are equal, round, and reactive.   Neck: Normal range of motion.  Neck supple.   CV: Regular rate and rhythm.  No murmurs.      Respiratory: Effort normal and breath sounds normal.  No wheezing or crackles.   Gastrointestinal: Soft.  No distension. There is no tenderness.  There is no rigidity, no rebound and no guarding.   Musculoskeletal: No cva tenderness   Skin: Skin is warm and dry.  No rash noted.   Rectal: Normal visualized external exam today.  No visualized external hemorrhoid or fissure.  Nontender digital rectal exam.  Possible internal hemorrhoid palpable.  No bleeding noted.    Emergency Department Course     Laboratory:  CBC: WBC 8.3, HGB 13.9,    CMP: Anion Gap 2 (L), o/w WNL (Creatinine 0.82)  UA with micro: Squamous Epithelial/HPF 4 (H), o/w negative  HCG qualitative: Negative  Occult blood stool: Negative  ABO/Rh Type and screen: O Negative  Lipase: 123    Interventions:  1851: NS 1 L IV Bolus     Emergency Department Course:  Past medical records, nursing notes, and vitals reviewed.  1828: I performed an exam of the patient and obtained history, as documented above.  IV inserted and blood drawn. This was sent to laboratory for testing, findings above. The patient provided a urine sample here in the emergency department. This was sent for laboratory testing, findings above. The patient provided a stool sample here in the " emergency department. This was sent for laboratory testing, findings above.    2024: I rechecked the patient.  Findings and plan explained to the Patient and mother. Patient discharged home with instructions regarding supportive care, medications, and reasons to return. The importance of close follow-up was reviewed.     Impression & Plan        Medical Decision Making  This patient presents with abdominal pain and blood in the stool.  The differential diagnosis is broad and includes:  Appendicitis, cholecystitis, peptic ulcer disease, diverticulitis, bowel obstruction, ischemia, pancreatitis, GI bleed amongst others.  CBC shows no leukocytosis or anemia.  No signs of active bleeding.  Occult is negative.  I do not suspect GI bleed.  CMP shows no acute electrolyte, metabolic or renal dysfunction.  LFTs and lipase are reassuring, no suggestion of hepatitis, cholecystitis, pancreatitis.  Additionally patient has no right upper quadrant tenderness to indicate acute cholecystitis.  Patient is no right lower quadrant tenderness to indicate appendicitis.  Urinalysis shows no evidence of acute UTI, additionally no evidence of blood to indicate nephrolithiasis.  Pregnancy test is negative.  Patient denies any vaginal discharge or bleeding to indicate PID, TOA, cervicitis, vaginitis etc.  No indication for pelvic exam.  Patient has a completely benign abdominal exam.  No indication for acute imaging today.  No active bleeding seen on rectal exam or an occult.  She is straining for stools and there is a possible internal hemorrhoid palpable, I suspect likely constipation and hemorrhoid induced rectal bleeding.  Encourage MiraLAX usage and increasing fluid intake.  Continue omeprazole by PCP as she is overall improving.  Based on clinical exam, laboratory testing, no significant etiologies were found. . The exact etiology of the pain is not clear at this time.  The patient will be discharged, and was warned that persistent or  worsening symptoms should prompt re-examination by a physician (ED if necessary) in 8-12 hours.  Close PCP follow up indicated.      Diagnosis:    ICD-10-CM    1. Generalized abdominal pain  R10.84    2. Constipation, unspecified constipation type  K59.00    3. Rectal bleeding  K62.5      Disposition:  discharged to home  [facility]  8/24/2020    EMERGENCY DEPARTMENT    Delia HART, am serving as a scribe at 11:06 PM on 8/24/2020 to document services personally performed by Sherlyn Medina MD based on my observations and the provider's statements to me.     Scribe Disclosure:  IPedro Chi, am serving as a scribe at 11:27 PM on 8/24/2020 to document services personally performed by Sherlyn Medina MD based on my observations and the provider's statements to me.            Sherlyn Medina MD  08/25/20 [id number]

## 2020-08-25 NOTE — DISCHARGE INSTRUCTIONS
Please take miralax twice daily until your see your PCP in 2-3 days.  If you have good bowel movements you may titrate the miralax down to once a day.    Drink plenty of water.      Return to the ED if unable to tolerate PO, abdominal pain, intractable nausea or vomiting, fevers or other acute changes.    Discharge Instructions  Abdominal Pain    Abdominal pain (belly pain) can be caused by many things. Your evaluation today does not show the exact cause for your pain. Your provider today has decided that it is unlikely your pain is due to a life threatening problem, or a problem requiring surgery or hospital admission. Sometimes those problems cannot be found right away, so it is very important that you follow up as directed.  Sometimes only the changes which occur over time allow the cause of your pain to be found.    Generally, every Emergency Department visit should have a follow-up clinic visit with either a primary or a specialty clinic/provider. Please follow-up as instructed by your emergency provider today. With abdominal pain, we often recommend very close follow-up, such as the following day.    ADULTS:  Return to the Emergency Department right away if:    You get an oral temperature above 102oF or as directed by your provider.  You have blood in your stools. This may be bright red or appear as black, tarry stools.    You keep vomiting (throwing up) or cannot drink liquids.  You see blood when you vomit.   You cannot have a bowel movement or you cannot pass gas.  Your stomach gets bloated or bigger.  Your skin or the whites of your eyes look yellow.  You faint.  You have bloody, frequent or painful urination (peeing).  You have new symptoms or anything that worries you.    CHILDREN:  Return to the Emergency Department right away if your child has any of the above-listed symptoms or the following:    Pushes your hand away or screams/cries when his/her belly is touched.  You notice your child is very fussy  or weak.  Your child is very tired and is too tired to eat or drink.  Your child is dehydrated.  Signs of dehydration can be:  Significant change in the amount of wet diapers/urine.  Your infant or child starts to have dry mouth and lips, or no saliva (spit) or tears.    PREGNANT WOMEN:  Return to the Emergency Department right away if you have any of the above-listed symptoms or the following:    You have bleeding, leaking fluid or passing tissue from the vagina.  You have worse pain or cramping, or pain in your shoulder or back.  You have vomiting that will not stop.  You have a temperature of 100oF or more.  Your baby is not moving as much as usual.  You faint.  You get a bad headache with or without eye problems and abdominal pain.  You have a seizure.  You have unusual discharge from your vagina and abdominal pain.    Abdominal pain is pretty common during pregnancy.  Your pain may or may not be related to your pregnancy. You should follow-up closely with your OB provider so they can evaluate you and your baby.  Until you follow-up with your regular provider, do the following:     Avoid sex and do not put anything in your vagina.  Drink clear fluids.  Only take medications approved by your provider.    MORE INFORMATION:    Appendicitis:  A possible cause of abdominal pain in any person who still has their appendix is acute appendicitis. Appendicitis is often hard to diagnose.  Testing does not always rule out early appendicitis or other causes of abdominal pain. Close follow-up with your provider and re-evaluations may be needed to figure out the reason for your abdominal pain.    Follow-up:  It is very important that you make an appointment with your clinic and go to the appointment.  If you do not follow-up with your primary provider, it may result in missing an important development which could result in permanent injury or disability and/or lasting pain.  If there is any problem keeping your appointment,  "call your provider or return to the Emergency Department.    Medications:  Take your medications as directed by your provider today.  Before using over-the-counter medications, ask your provider and make sure to take the medications as directed.  If you have any questions about medications, ask your provider.    Diet:  Resume your normal diet as much as possible, but do not eat fried, fatty or spicy foods while you have pain.  Do not drink alcohol or have caffeine.  Do not smoke tobacco.    Probiotics: If you have been given an antibiotic, you may want to also take a probiotic pill or eat yogurt with live cultures. Probiotics have \"good bacteria\" to help your intestines stay healthy. Studies have shown that probiotics help prevent diarrhea (loose stools) and other intestine problems (including C. diff infection) when you take antibiotics. You can buy these without a prescription in the pharmacy section of the store.     If you were given a prescription for medicine here today, be sure to read all of the information (including the package insert) that comes with your prescription.  This will include important information about the medicine, its side effects, and any warnings that you need to know about.  The pharmacist who fills the prescription can provide more information and answer questions you may have about the medicine.  If you have questions or concerns that the pharmacist cannot address, please call or return to the Emergency Department.       Remember that you can always come back to the Emergency Department if you are not able to see your regular provider in the amount of time listed above, if you get any new symptoms, or if there is anything that worries you.    Discharge Instructions  Constipation  Constipation can cause severe cramping pain and your provider thinks this might be the cause of your abdominal pain (belly pain) today.  People usually recognize that they are constipated because they have " difficulty having bowel movements, are not having bowel movements frequently enough, or are not having large enough bowel movements. Sometimes, especially in children or older people, you do not recognize that you are constipated until it becomes severe. The most common causes of constipation are a lack of exercise and not eating enough fruits, vegetables, and whole grains. Constipation can also be a side effect of medications, such as narcotics, or may be caused by a disease of the digestive system.    Generally, every Emergency Department visit should have a follow-up clinic visit with either a primary or a specialty clinic/provider. Please follow-up as instructed by your emergency provider today. Sometimes, chronic constipation requires further testing to determine the cause. If you are over 50 years old, you may need a colonoscopy if you have not had one before.     Return to the Emergency Department if:  Your abdominal pain worsens or does not improve after a bowel movement.  You become very weak.  You get a temperature above 102oF or as directed by your provider.  You have blood in your stools (bright red or black, tarry stools).  You keep vomiting (throwing up) or cannot drink liquids.  Your see blood when you vomit.  Your stomach gets bloated or bigger.  You have new symptoms or anything that worries you.    What can I do to help myself?  If your provider gave you a cathartic medication, like magnesium citrate or GoLytely  (polyethylene glycol), you can expect to have cramps and gas pains after taking it. You can expect to have a number of bowel movements and even diarrhea (loose or watery stools) in the course of clearing your bowels.  You will know your bowels have been cleaned out after you pass clear liquid. The cramps and gas should let up after you have emptied your bowels. You may want to wait until morning to take this type of medication so you aren t up in the night.   Sometimes instead of  cathartics, we recommend laxatives like milk of magnesia to move your bowels more slowly, or an enema to help the bowels to move. Read and follow the package directions, or follow your provider s instructions.  Once you have become very constipated, it takes time for your bowels to return to normal and you need to be very careful to prevent becoming constipated again. Take a laxative if you do not move your bowels at least every two days.     Eat foods that have a lot of fiber. Good choices are fruits, vegetables, prune juice, apple juice, and high fiber cereal. Limit dairy products such as milk and cheese, since these can make constipation worse.   Drink plenty of water.   When you feel the need to go to the bathroom, go to the bathroom. Do not hold it.  Miralax , Metamucil , Colace , Senna or fiber supplements can be used daily.  Miralax  daily is often the best choice for children.  If you were given a prescription for medicine here today, be sure to read all of the information (including the package insert) that comes with your prescription.  This will include important information about the medicine, its side effects, and any warnings that you need to know about.  The pharmacist who fills the prescription can provide more information and answer questions you may have about the medicine.  If you have questions or concerns that the pharmacist cannot address, please call or return to the Emergency Department.   Remember that you can always come back to the Emergency Department if you are not able to see your regular provider in the amount of time listed above, if you get any new symptoms, or if there is anything that worries you.

## 2021-10-14 ENCOUNTER — APPOINTMENT (OUTPATIENT)
Dept: GENERAL RADIOLOGY | Facility: CLINIC | Age: 26
End: 2021-10-14
Attending: EMERGENCY MEDICINE
Payer: COMMERCIAL

## 2021-10-14 ENCOUNTER — HOSPITAL ENCOUNTER (EMERGENCY)
Facility: CLINIC | Age: 26
Discharge: HOME | End: 2021-10-14
Attending: EMERGENCY MEDICINE | Admitting: EMERGENCY MEDICINE
Payer: COMMERCIAL

## 2021-10-14 ENCOUNTER — APPOINTMENT (OUTPATIENT)
Dept: CT IMAGING | Facility: CLINIC | Age: 26
End: 2021-10-14
Attending: EMERGENCY MEDICINE
Payer: COMMERCIAL

## 2021-10-14 VITALS
RESPIRATION RATE: 14 BRPM | HEART RATE: 87 BPM | DIASTOLIC BLOOD PRESSURE: 56 MMHG | OXYGEN SATURATION: 96 % | TEMPERATURE: 97.2 F | SYSTOLIC BLOOD PRESSURE: 111 MMHG

## 2021-10-14 DIAGNOSIS — F41.9 ANXIETY: ICD-10-CM

## 2021-10-14 DIAGNOSIS — R07.9 CHEST PAIN, UNSPECIFIED TYPE: ICD-10-CM

## 2021-10-14 LAB
ALBUMIN SERPL-MCNC: 3.1 G/DL (ref 3.4–5)
ALP SERPL-CCNC: 61 U/L (ref 40–150)
ALT SERPL W P-5'-P-CCNC: 19 U/L (ref 0–50)
ANION GAP SERPL CALCULATED.3IONS-SCNC: 3 MMOL/L (ref 3–14)
AST SERPL W P-5'-P-CCNC: 10 U/L (ref 0–45)
ATRIAL RATE - MUSE: 97 BPM
BASOPHILS # BLD AUTO: 0 10E3/UL (ref 0–0.2)
BASOPHILS NFR BLD AUTO: 0 %
BILIRUB SERPL-MCNC: 0.2 MG/DL (ref 0.2–1.3)
BUN SERPL-MCNC: 10 MG/DL (ref 7–30)
CALCIUM SERPL-MCNC: 8.6 MG/DL (ref 8.5–10.1)
CHLORIDE BLD-SCNC: 107 MMOL/L (ref 94–109)
CO2 SERPL-SCNC: 27 MMOL/L (ref 20–32)
CREAT SERPL-MCNC: 0.86 MG/DL (ref 0.52–1.04)
D DIMER PPP FEU-MCNC: 2.23 UG/ML FEU (ref 0–0.5)
DIASTOLIC BLOOD PRESSURE - MUSE: NORMAL MMHG
EOSINOPHIL # BLD AUTO: 0 10E3/UL (ref 0–0.7)
EOSINOPHIL NFR BLD AUTO: 0 %
ERYTHROCYTE [DISTWIDTH] IN BLOOD BY AUTOMATED COUNT: 12.6 % (ref 10–15)
GFR SERPL CREATININE-BSD FRML MDRD: >90 ML/MIN/1.73M2
GLUCOSE BLD-MCNC: 103 MG/DL (ref 70–99)
HCT VFR BLD AUTO: 43.3 % (ref 35–47)
HGB BLD-MCNC: 14.1 G/DL (ref 11.7–15.7)
IMM GRANULOCYTES # BLD: 0 10E3/UL
IMM GRANULOCYTES NFR BLD: 0 %
INTERPRETATION ECG - MUSE: NORMAL
LIPASE SERPL-CCNC: 85 U/L (ref 73–393)
LYMPHOCYTES # BLD AUTO: 1.2 10E3/UL (ref 0.8–5.3)
LYMPHOCYTES NFR BLD AUTO: 23 %
MCH RBC QN AUTO: 30 PG (ref 26.5–33)
MCHC RBC AUTO-ENTMCNC: 32.6 G/DL (ref 31.5–36.5)
MCV RBC AUTO: 92 FL (ref 78–100)
MONOCYTES # BLD AUTO: 0.3 10E3/UL (ref 0–1.3)
MONOCYTES NFR BLD AUTO: 6 %
NEUTROPHILS # BLD AUTO: 3.7 10E3/UL (ref 1.6–8.3)
NEUTROPHILS NFR BLD AUTO: 71 %
NRBC # BLD AUTO: 0 10E3/UL
NRBC BLD AUTO-RTO: 0 /100
P AXIS - MUSE: 45 DEGREES
PLATELET # BLD AUTO: 234 10E3/UL (ref 150–450)
POTASSIUM BLD-SCNC: 3.5 MMOL/L (ref 3.4–5.3)
PR INTERVAL - MUSE: 132 MS
PROT SERPL-MCNC: 7 G/DL (ref 6.8–8.8)
QRS DURATION - MUSE: 72 MS
QT - MUSE: 326 MS
QTC - MUSE: 414 MS
R AXIS - MUSE: 19 DEGREES
RBC # BLD AUTO: 4.7 10E6/UL (ref 3.8–5.2)
SODIUM SERPL-SCNC: 137 MMOL/L (ref 133–144)
SYSTOLIC BLOOD PRESSURE - MUSE: NORMAL MMHG
T AXIS - MUSE: 27 DEGREES
TROPONIN I SERPL-MCNC: <0.015 UG/L (ref 0–0.04)
VENTRICULAR RATE- MUSE: 97 BPM
WBC # BLD AUTO: 5.3 10E3/UL (ref 4–11)

## 2021-10-14 PROCEDURE — 71046 X-RAY EXAM CHEST 2 VIEWS: CPT

## 2021-10-14 PROCEDURE — 250N000013 HC RX MED GY IP 250 OP 250 PS 637: Performed by: EMERGENCY MEDICINE

## 2021-10-14 PROCEDURE — 93005 ELECTROCARDIOGRAM TRACING: CPT

## 2021-10-14 PROCEDURE — 84484 ASSAY OF TROPONIN QUANT: CPT | Performed by: EMERGENCY MEDICINE

## 2021-10-14 PROCEDURE — 82040 ASSAY OF SERUM ALBUMIN: CPT | Performed by: EMERGENCY MEDICINE

## 2021-10-14 PROCEDURE — 99285 EMERGENCY DEPT VISIT HI MDM: CPT | Mod: 25

## 2021-10-14 PROCEDURE — 250N000011 HC RX IP 250 OP 636: Performed by: EMERGENCY MEDICINE

## 2021-10-14 PROCEDURE — 250N000009 HC RX 250: Performed by: EMERGENCY MEDICINE

## 2021-10-14 PROCEDURE — 85025 COMPLETE CBC W/AUTO DIFF WBC: CPT | Performed by: EMERGENCY MEDICINE

## 2021-10-14 PROCEDURE — 83690 ASSAY OF LIPASE: CPT | Performed by: EMERGENCY MEDICINE

## 2021-10-14 PROCEDURE — 36415 COLL VENOUS BLD VENIPUNCTURE: CPT | Performed by: EMERGENCY MEDICINE

## 2021-10-14 PROCEDURE — 71275 CT ANGIOGRAPHY CHEST: CPT

## 2021-10-14 PROCEDURE — 85379 FIBRIN DEGRADATION QUANT: CPT | Performed by: EMERGENCY MEDICINE

## 2021-10-14 RX ORDER — IOPAMIDOL 755 MG/ML
74 INJECTION, SOLUTION INTRAVASCULAR ONCE
Status: COMPLETED | OUTPATIENT
Start: 2021-10-14 | End: 2021-10-14

## 2021-10-14 RX ORDER — LORAZEPAM 0.5 MG/1
0.5 TABLET ORAL ONCE
Status: COMPLETED | OUTPATIENT
Start: 2021-10-14 | End: 2021-10-14

## 2021-10-14 RX ADMIN — IOPAMIDOL 74 ML: 755 INJECTION, SOLUTION INTRAVENOUS at 18:37

## 2021-10-14 RX ADMIN — LORAZEPAM 0.5 MG: 0.5 TABLET ORAL at 19:22

## 2021-10-14 RX ADMIN — SODIUM CHLORIDE 97 ML: 900 INJECTION INTRAVENOUS at 18:38

## 2021-10-14 ASSESSMENT — ENCOUNTER SYMPTOMS
NAUSEA: 0
NERVOUS/ANXIOUS: 0
SHORTNESS OF BREATH: 0
CHEST TIGHTNESS: 1
DIAPHORESIS: 1

## 2021-10-14 NOTE — ED TRIAGE NOTES
Pt woken with chest pressure this morning. Gets worse with movement. Hx of anxiety, tried hydroxyzine thinking thinking this was panic, no change.

## 2021-10-14 NOTE — ED PROVIDER NOTES
"  History   Chief Complaint:  Chest Pain       HPI   Mra Velásquez is a 26 year old female with history of anxiety who presents with intermittent left sided chest pain earlier today starting at 1300.  She noted the pain arose randomly and was not accompanied by increased anxiety. She explained the pain as pressure like a \"balloon\" and reported aggravation upon movement. She also complained of diaphoresis but denied shortness of breath or nausea. She noted the chest pain episodes were intense for the first few hours, rating it 8/10 but has decreased in intensity and is resolved in the emergency department. Earlier, she took hydroxyzine which did not alleviate pain. She explained she has had previous episodes of chest pain but not as intense, noting occasional chest pain while exercising. Denied hormone use.    Of note, she smokes a pack of cigarettes a day. She also explained that she was recently engaged and in [place], returning to [place] about two weeks ago at the end of September.       Review of Systems   Constitutional: Positive for diaphoresis.   Respiratory: Positive for chest tightness. Negative for shortness of breath.    Cardiovascular: Positive for chest pain.   Gastrointestinal: Negative for nausea.   Psychiatric/Behavioral: The patient is not nervous/anxious.    All other systems reviewed and are negative.        Allergies:  The patient has no known allergies.     Medications:  Albuterol  Flonase  Prilosec  Elavil  Adderall  Atarax  Cymbalta    Past Medical History:     ADHD  Anxiety  Depression  Pregnancy test positive      Past Surgical History:    ENT surgery  Tonsillectomy     Family History:    Father: HTN, diabetes  Mother: Type 2 diabetes  Paternal grandfather: Alzheimers    Social History:  Presents alone.   Smokes a pack of cigarettes per day.     Physical Exam     Patient Vitals for the past 24 hrs:   BP Temp Temp src Pulse Resp SpO2   10/14/21 1852 -- -- -- 87 -- --   10/14/21 1851 -- -- -- " 86 14 --   10/14/21 1812 -- -- -- 94 23 96 %   10/14/21 1811 111/56 -- -- 95 14 97 %   10/14/21 1446 93/69 97.2  F (36.2  C) Temporal 105 20 97 %       Physical Exam  General: Patient is alert and mildly anxious appearing.  HEENT: Head atraumatic    Eyes: pupils equal and reactive. Conjunctiva clear   Nares: patent   Oropharynx: no lesions, uvula midline, no palatal draping, normal voice, no trismus  Neck: Supple without lymphadenopathy, no meningismus  Chest: Heart regular rate and rhythm.   Lungs: Equal clear to auscultation with no wheeze or rales  Abdomen: Soft, non tender, nondistended, normal bowel sounds  Back: No costovertebral angle tenderness, no midline C, T or L spine tenderness  Neuro: Grossly nonfocal, normal speech, strength equal bilaterally, CN 2-12 intact  Extremities: No deformities, equal radial and DP pulses. No clubbing, cyanosis.  No edema  Skin: Warm and dry with no rash.       Emergency Department Course   ECG  ECG obtained at 1457, ECG read at 1725  Normal sinus rhythm  Normal ECG   Rate 97 bpm. RI interval 132 ms. QRS duration 72 ms. QT/QTc 326/414 ms. P-R-T axes 45 19 27.     Imaging:  CT Chest Pulmonary Embolism w Contrast   Final Result   IMPRESSION:   1.  No evidence for pulmonary embolism.      XR Chest 2 Views   Final Result   IMPRESSION: Negative chest.        Report per radiology    Laboratory:  Labs Ordered and Resulted from Time of ED Arrival Up to the Time of Departure from the ED   D DIMER QUANTITATIVE - Abnormal; Notable for the following components:       Result Value    D-Dimer Quantitative 2.23 (*)     All other components within normal limits    Narrative:     This D-dimer assay is intended for use in conjunction with a clinical pretest probability assessment model to exclude pulmonary embolism (PE) and deep venous thrombosis (DVT) in outpatients suspected of PE or DVT. The cut-off value is 0.50 ug/mL FEU.   COMPREHENSIVE METABOLIC PANEL - Abnormal; Notable for the  following components:    Glucose 103 (*)     Albumin 3.1 (*)     All other components within normal limits   LIPASE - Normal   TROPONIN I - Normal   CBC WITH PLATELETS AND DIFFERENTIAL   CARDIAC CONTINUOUS MONITORING   CBC WITH PLATELETS & DIFFERENTIAL    Narrative:     The following orders were created for panel order CBC with platelets differential.  Procedure                               Abnormality         Status                     ---------                               -----------         ------                     CBC with platelets and d...[[id number]]                      Final result                 Please view results for these tests on the individual orders.      Emergency Department Course:  Reviewed:  I reviewed nursing notes, vitals, past medical history and Care Everywhere    Assessments:  1724 I obtained history and examined the patient as noted above.   1918 I rechecked the patient and explained findings.     Interventions:  1922 Ativan 0.5 mg PO    Disposition:  The patient was discharged to home.     Impression & Plan     Medical Decision Making:  Patient is a 26-year-old female with history of anxiety presents the emergency department with atypical chest pain.  She describes a left-sided chest pain that would last less than 10 seconds at a time and going come.  She describes 0 out of 10 chest pain at this time.  She felt like it was her anxiety and took a dose of her hydroxyzine at home with some improvement.  While here in the emergency department she had increased anxiety and that symptoms returned.  Ativan dose was given with improvement.  Patient is afebrile and hemodynamically stable.  EKG without acute ischemic changes and troponin negative making acute coronary syndrome unlikely.  Patient did have recent long travel at the end of September as well as is on birth control therefore D-dimer was sent.  That was noted to be elevated so she went for CT scan which thankfully rules out pulmonary  embolism.  No evidence of pneumothorax or pneumonia.  Do not suspect aortic dissection.  Is with reasonable clinical certainty that I feel the patient is safe for discharge.  Encouraged her to follow-up with therapist as well as use her medications to control anxiety.  Return precautions related to her chest pain were reviewed.  Follow-up instructions with primary care was discussed as well.  Patient expressed agreement understanding the plan and all questions and concerns addressed.    Diagnosis:    ICD-10-CM    1. Chest pain, unspecified type  R07.9    2. Anxiety  F41.9        Scribe Disclosure:  I, Zahra Dorantes, am serving as a scribe at 5:24 PM on 10/14/2021 to document services personally performed by Diya Fierro MD based on my observations and the provider's statements to me.              Diya Fierro MD  10/14/21 2107

## 2023-11-13 ENCOUNTER — OFFICE VISIT (OUTPATIENT)
Dept: URGENT CARE | Facility: URGENT CARE | Age: 28
End: 2023-11-13
Payer: COMMERCIAL

## 2023-11-13 VITALS
DIASTOLIC BLOOD PRESSURE: 62 MMHG | BODY MASS INDEX: 28.48 KG/M2 | SYSTOLIC BLOOD PRESSURE: 100 MMHG | TEMPERATURE: 98 F | WEIGHT: 171.13 LBS | RESPIRATION RATE: 16 BRPM | HEART RATE: 106 BPM | OXYGEN SATURATION: 98 %

## 2023-11-13 DIAGNOSIS — R09.81 NASAL CONGESTION: ICD-10-CM

## 2023-11-13 DIAGNOSIS — B96.89 BACTERIAL SINUSITIS: Primary | ICD-10-CM

## 2023-11-13 DIAGNOSIS — J30.2 SEASONAL ALLERGIC RHINITIS, UNSPECIFIED TRIGGER: ICD-10-CM

## 2023-11-13 DIAGNOSIS — R05.9 COUGH, UNSPECIFIED TYPE: ICD-10-CM

## 2023-11-13 DIAGNOSIS — J32.9 BACTERIAL SINUSITIS: Primary | ICD-10-CM

## 2023-11-13 PROCEDURE — 99203 OFFICE O/P NEW LOW 30 MIN: CPT | Performed by: PHYSICIAN ASSISTANT

## 2023-11-13 RX ORDER — HYDROXYZINE HYDROCHLORIDE 10 MG/1
10 TABLET, FILM COATED ORAL PRN
COMMUNITY
Start: 2023-01-13

## 2023-11-13 RX ORDER — BENZONATATE 200 MG/1
200 CAPSULE ORAL 3 TIMES DAILY PRN
Qty: 21 CAPSULE | Refills: 0 | Status: SHIPPED | OUTPATIENT
Start: 2023-11-13 | End: 2023-11-20

## 2023-11-13 RX ORDER — POLYETHYLENE GLYCOL 3350 17 G/17G
17 POWDER, FOR SOLUTION ORAL DAILY
COMMUNITY
Start: 2023-01-13 | End: 2024-01-13

## 2023-11-13 RX ORDER — FLUTICASONE PROPIONATE 50 MCG
1 SPRAY, SUSPENSION (ML) NASAL DAILY
Qty: 16 G | Refills: 0 | Status: SHIPPED | OUTPATIENT
Start: 2023-11-13

## 2023-11-13 RX ORDER — DULOXETIN HYDROCHLORIDE 30 MG/1
30 CAPSULE, DELAYED RELEASE ORAL DAILY
COMMUNITY
Start: 2023-08-13

## 2023-11-13 RX ORDER — CETIRIZINE HYDROCHLORIDE 10 MG/1
10 TABLET ORAL DAILY
Qty: 30 TABLET | Refills: 0 | Status: SHIPPED | OUTPATIENT
Start: 2023-11-13

## 2023-11-13 NOTE — LETTER
November 13, 2023      Mar Velásquez  [address] [id number]        To Whom It May Concern:    Mar Velásquez was seen in our clinic and will miss work today and tomorrow.     Sincerely,        Seth Cobos, [facility], PA-C

## 2023-11-14 DIAGNOSIS — J32.9 BACTERIAL SINUSITIS: ICD-10-CM

## 2023-11-14 DIAGNOSIS — J32.9 BACTERIAL SINUSITIS: Primary | ICD-10-CM

## 2023-11-14 DIAGNOSIS — B96.89 BACTERIAL SINUSITIS: Primary | ICD-10-CM

## 2023-11-14 DIAGNOSIS — R05.9 COUGH, UNSPECIFIED TYPE: ICD-10-CM

## 2023-11-14 DIAGNOSIS — J30.2 SEASONAL ALLERGIC RHINITIS, UNSPECIFIED TRIGGER: ICD-10-CM

## 2023-11-14 DIAGNOSIS — B96.89 BACTERIAL SINUSITIS: ICD-10-CM

## 2023-11-14 DIAGNOSIS — R09.81 NASAL CONGESTION: ICD-10-CM

## 2023-11-14 RX ORDER — BENZONATATE 200 MG/1
200 CAPSULE ORAL 3 TIMES DAILY PRN
Qty: 21 CAPSULE | Refills: 0 | Status: SHIPPED | OUTPATIENT
Start: 2023-11-14

## 2023-11-14 RX ORDER — BENZONATATE 200 MG/1
200 CAPSULE ORAL 3 TIMES DAILY PRN
Qty: 21 CAPSULE | Refills: 0 | Status: CANCELLED | OUTPATIENT
Start: 2023-11-14

## 2023-11-14 RX ORDER — CETIRIZINE HYDROCHLORIDE 10 MG/1
10 TABLET ORAL EVERY EVENING
Qty: 30 TABLET | Refills: 0 | Status: SHIPPED | OUTPATIENT
Start: 2023-11-14

## 2023-11-14 RX ORDER — FLUTICASONE PROPIONATE 50 MCG
1 SPRAY, SUSPENSION (ML) NASAL DAILY
Qty: 16 G | Refills: 0 | Status: CANCELLED | OUTPATIENT
Start: 2023-11-14

## 2023-11-14 RX ORDER — FLUTICASONE PROPIONATE 50 MCG
2 SPRAY, SUSPENSION (ML) NASAL DAILY
Qty: 18.2 ML | Refills: 0 | Status: SHIPPED | OUTPATIENT
Start: 2023-11-14

## 2023-11-14 RX ORDER — CETIRIZINE HYDROCHLORIDE 10 MG/1
10 TABLET ORAL DAILY
Qty: 30 TABLET | Refills: 0 | Status: CANCELLED | OUTPATIENT
Start: 2023-11-14

## 2023-11-14 NOTE — PROGRESS NOTES
Assessment & Plan   Problem List Items Addressed This Visit    None  Visit Diagnoses       Bacterial sinusitis    -  Primary    Relevant Medications    amoxicillin-clavulanate (AUGMENTIN) 875-125 MG tablet    fluticasone (FLONASE) 50 MCG/ACT nasal spray    cetirizine (ZYRTEC) 10 MG tablet    benzonatate (TESSALON) 200 MG capsule    Nasal congestion        Relevant Medications    amoxicillin-clavulanate (AUGMENTIN) 875-125 MG tablet    Cough, unspecified type        Relevant Medications    fluticasone (FLONASE) 50 MCG/ACT nasal spray    cetirizine (ZYRTEC) 10 MG tablet    benzonatate (TESSALON) 200 MG capsule    Seasonal allergic rhinitis, unspecified trigger        Relevant Medications    fluticasone (FLONASE) 50 MCG/ACT nasal spray    cetirizine (ZYRTEC) 10 MG tablet             Review of external notes as documented elsewhere in note       Nicotine/Tobacco Cessation:  She reports that she has been smoking cigarettes. She has been smoking an average of .5 packs per day. She has never used smokeless tobacco.  Nicotine/Tobacco Cessation Plan:     At today's visit with Mar Velásquez , we discussed results, diagnosis, medications and formulated a plan.  We also discussed red flags for immediate return to clinic/ER, as well as indications for follow up with PCP if not improved in 3 days. Patient understood and agreed to plan. Mar Velásquez was discharged with stable vitals and has no further questions.       No follow-ups on file.    Seth Cobos, [facility], PA-C  [facility]    Adrienne Manuel is a 28 year old, presenting for the following health issues:  Cough (1 week, sinus pressure/pain/drainage, chest congestion, neg covid)      HPI Review of Systems   Constitutional, HEENT, cardiovascular, pulmonary, GI, , musculoskeletal, neuro, skin, endocrine and psych systems are negative, except as otherwise noted.      Objective    /62   Pulse 106   Temp 98  F (36.7  C)   Resp 16   Wt  77.6 kg (171 lb 2 oz)   SpO2 98%   BMI 28.48 kg/m    Body mass index is 28.48 kg/m .  Physical Exam   GENERAL: healthy, alert and no distress  EYES: Eyes grossly normal to inspection, PERRL and conjunctivae and sclerae normal  HENT: normal cephalic/atraumatic, ear canals and TM's normal, nose and mouth without ulcers or lesions, rhinorrhea purulent, and sinuses: maxillary, frontal tenderness on both sides  NECK: no adenopathy, no asymmetry, masses, or scars and thyroid normal to palpation  RESP: lungs clear to auscultation - no rales, rhonchi or wheezes  CV: regular rate and rhythm, normal S1 S2, no S3 or S4, no murmur, click or rub, no peripheral edema and peripheral pulses strong  MS: no gross musculoskeletal defects noted, no edema  SKIN: no suspicious lesions or rashes  NEURO: Normal strength and tone, mentation intact and speech normal  PSYCH: mentation appears normal, affect normal/bright        No results found for any visits on 11/13/23.

## 2023-11-14 NOTE — TELEPHONE ENCOUNTER
Pt's was seen in  yesterday 11/13/2023. Pt's mom requesting to send rx to different pharmacy: [facility] in [place]. No consent to communicate on file.     Called [facility] [phone] : no record on file.     Sending to [facility] refill; please review and send pending refills to pharmacy if able to transfer rx to different pharmacy location.

## 2023-11-14 NOTE — PROGRESS NOTES
No chief complaint on file.    Meds were not at the pharmacy, patient would like sent to [facility] in [place].    Stopped into clinic and requested this.     Jessenia SOUZA, PAMendelC

## 2023-11-14 NOTE — TELEPHONE ENCOUNTER
Mother called back and now states that she wants prescriptions to go to [facility]. Patient gave verbal consent to speak with her mother.     Advised mother to contact [facility] and ask that [facility] transfer  the 4 prescriptions ordered yesterday by Seth Cobos PA-C.    Mother verbalizes understanding and agrees with plan. Jessenia Rodgers RN